# Patient Record
Sex: MALE | Race: WHITE | NOT HISPANIC OR LATINO | Employment: FULL TIME | URBAN - METROPOLITAN AREA
[De-identification: names, ages, dates, MRNs, and addresses within clinical notes are randomized per-mention and may not be internally consistent; named-entity substitution may affect disease eponyms.]

---

## 2021-05-12 ENCOUNTER — RECORDS - HEALTHEAST (OUTPATIENT)
Dept: LAB | Facility: CLINIC | Age: 31
End: 2021-05-12

## 2021-05-12 LAB
ALBUMIN SERPL-MCNC: 4.5 G/DL (ref 3.5–5)
ALP SERPL-CCNC: 79 U/L (ref 45–120)
ALT SERPL W P-5'-P-CCNC: 107 U/L (ref 0–45)
ANION GAP SERPL CALCULATED.3IONS-SCNC: 17 MMOL/L (ref 5–18)
AST SERPL W P-5'-P-CCNC: 104 U/L (ref 0–40)
BILIRUB SERPL-MCNC: 0.9 MG/DL (ref 0–1)
BUN SERPL-MCNC: 8 MG/DL (ref 8–22)
CALCIUM SERPL-MCNC: 8.9 MG/DL (ref 8.5–10.5)
CHLORIDE BLD-SCNC: 105 MMOL/L (ref 98–107)
CO2 SERPL-SCNC: 22 MMOL/L (ref 22–31)
CREAT SERPL-MCNC: 0.73 MG/DL (ref 0.7–1.3)
FERRITIN SERPL-MCNC: 549 NG/ML (ref 27–300)
GFR SERPL CREATININE-BSD FRML MDRD: >60 ML/MIN/1.73M2
GLUCOSE BLD-MCNC: 76 MG/DL (ref 70–125)
IRON SATN MFR SERPL: 38 % (ref 20–50)
IRON SERPL-MCNC: 146 UG/DL (ref 42–175)
POTASSIUM BLD-SCNC: 4.3 MMOL/L (ref 3.5–5)
PROT SERPL-MCNC: 7.4 G/DL (ref 6–8)
SODIUM SERPL-SCNC: 144 MMOL/L (ref 136–145)
TIBC SERPL-MCNC: 381 UG/DL (ref 313–563)
TRANSFERRIN SERPL-MCNC: 305 MG/DL (ref 212–360)
VIT B12 SERPL-MCNC: 165 PG/ML (ref 213–816)

## 2021-06-01 ENCOUNTER — RECORDS - HEALTHEAST (OUTPATIENT)
Dept: ADMINISTRATIVE | Facility: CLINIC | Age: 31
End: 2021-06-01

## 2022-09-07 ENCOUNTER — HOSPITAL ENCOUNTER (EMERGENCY)
Facility: CLINIC | Age: 32
Discharge: HOME | End: 2022-09-08
Attending: STUDENT IN AN ORGANIZED HEALTH CARE EDUCATION/TRAINING PROGRAM | Admitting: STUDENT IN AN ORGANIZED HEALTH CARE EDUCATION/TRAINING PROGRAM
Payer: COMMERCIAL

## 2022-09-07 DIAGNOSIS — E83.42 HYPOMAGNESEMIA: ICD-10-CM

## 2022-09-07 DIAGNOSIS — R11.2 NON-INTRACTABLE VOMITING WITH NAUSEA, UNSPECIFIED VOMITING TYPE: ICD-10-CM

## 2022-09-07 DIAGNOSIS — K70.10 ALCOHOLIC HEPATITIS WITHOUT ASCITES (H): ICD-10-CM

## 2022-09-07 LAB
ALBUMIN SERPL-MCNC: 4.3 G/DL (ref 3.5–5)
ALP SERPL-CCNC: 86 U/L (ref 45–120)
ALT SERPL W P-5'-P-CCNC: 156 U/L (ref 0–45)
ANION GAP SERPL CALCULATED.3IONS-SCNC: 21 MMOL/L (ref 5–18)
AST SERPL W P-5'-P-CCNC: 196 U/L (ref 0–40)
BASOPHILS # BLD AUTO: 0.1 10E3/UL (ref 0–0.2)
BASOPHILS NFR BLD AUTO: 1 %
BILIRUB DIRECT SERPL-MCNC: 0.8 MG/DL
BILIRUB SERPL-MCNC: 2.3 MG/DL (ref 0–1)
BUN SERPL-MCNC: 8 MG/DL (ref 8–22)
CALCIUM SERPL-MCNC: 10.1 MG/DL (ref 8.5–10.5)
CHLORIDE BLD-SCNC: 92 MMOL/L (ref 98–107)
CO2 SERPL-SCNC: 25 MMOL/L (ref 22–31)
CREAT SERPL-MCNC: 0.73 MG/DL (ref 0.7–1.3)
EOSINOPHIL # BLD AUTO: 0 10E3/UL (ref 0–0.7)
EOSINOPHIL NFR BLD AUTO: 0 %
ERYTHROCYTE [DISTWIDTH] IN BLOOD BY AUTOMATED COUNT: 12.4 % (ref 10–15)
ETHANOL SERPL-MCNC: <10 MG/DL
GFR SERPL CREATININE-BSD FRML MDRD: >90 ML/MIN/1.73M2
GLUCOSE BLD-MCNC: 98 MG/DL (ref 70–125)
GLUCOSE BLDC GLUCOMTR-MCNC: 127 MG/DL (ref 70–99)
HCT VFR BLD AUTO: 44.4 % (ref 40–53)
HGB BLD-MCNC: 16.1 G/DL (ref 13.3–17.7)
IMM GRANULOCYTES # BLD: 0 10E3/UL
IMM GRANULOCYTES NFR BLD: 0 %
LIPASE SERPL-CCNC: 27 U/L (ref 0–52)
LYMPHOCYTES # BLD AUTO: 0.7 10E3/UL (ref 0.8–5.3)
LYMPHOCYTES NFR BLD AUTO: 10 %
MAGNESIUM SERPL-MCNC: 1.7 MG/DL (ref 1.8–2.6)
MCH RBC QN AUTO: 33.2 PG (ref 26.5–33)
MCHC RBC AUTO-ENTMCNC: 36.3 G/DL (ref 31.5–36.5)
MCV RBC AUTO: 92 FL (ref 78–100)
MONOCYTES # BLD AUTO: 0.9 10E3/UL (ref 0–1.3)
MONOCYTES NFR BLD AUTO: 13 %
NEUTROPHILS # BLD AUTO: 5.1 10E3/UL (ref 1.6–8.3)
NEUTROPHILS NFR BLD AUTO: 76 %
NRBC # BLD AUTO: 0 10E3/UL
NRBC BLD AUTO-RTO: 0 /100
PLATELET # BLD AUTO: 199 10E3/UL (ref 150–450)
POTASSIUM BLD-SCNC: 2.6 MMOL/L (ref 3.5–5)
PROT SERPL-MCNC: 7.8 G/DL (ref 6–8)
RBC # BLD AUTO: 4.85 10E6/UL (ref 4.4–5.9)
SODIUM SERPL-SCNC: 138 MMOL/L (ref 136–145)
WBC # BLD AUTO: 6.8 10E3/UL (ref 4–11)

## 2022-09-07 PROCEDURE — 82077 ASSAY SPEC XCP UR&BREATH IA: CPT | Performed by: STUDENT IN AN ORGANIZED HEALTH CARE EDUCATION/TRAINING PROGRAM

## 2022-09-07 PROCEDURE — 82310 ASSAY OF CALCIUM: CPT | Performed by: STUDENT IN AN ORGANIZED HEALTH CARE EDUCATION/TRAINING PROGRAM

## 2022-09-07 PROCEDURE — C9113 INJ PANTOPRAZOLE SODIUM, VIA: HCPCS | Performed by: STUDENT IN AN ORGANIZED HEALTH CARE EDUCATION/TRAINING PROGRAM

## 2022-09-07 PROCEDURE — 258N000003 HC RX IP 258 OP 636: Performed by: STUDENT IN AN ORGANIZED HEALTH CARE EDUCATION/TRAINING PROGRAM

## 2022-09-07 PROCEDURE — 83690 ASSAY OF LIPASE: CPT | Performed by: STUDENT IN AN ORGANIZED HEALTH CARE EDUCATION/TRAINING PROGRAM

## 2022-09-07 PROCEDURE — 96366 THER/PROPH/DIAG IV INF ADDON: CPT

## 2022-09-07 PROCEDURE — 82248 BILIRUBIN DIRECT: CPT | Performed by: STUDENT IN AN ORGANIZED HEALTH CARE EDUCATION/TRAINING PROGRAM

## 2022-09-07 PROCEDURE — 250N000011 HC RX IP 250 OP 636: Performed by: STUDENT IN AN ORGANIZED HEALTH CARE EDUCATION/TRAINING PROGRAM

## 2022-09-07 PROCEDURE — 83735 ASSAY OF MAGNESIUM: CPT | Performed by: STUDENT IN AN ORGANIZED HEALTH CARE EDUCATION/TRAINING PROGRAM

## 2022-09-07 PROCEDURE — 99285 EMERGENCY DEPT VISIT HI MDM: CPT | Mod: 25

## 2022-09-07 PROCEDURE — 96361 HYDRATE IV INFUSION ADD-ON: CPT

## 2022-09-07 PROCEDURE — 96375 TX/PRO/DX INJ NEW DRUG ADDON: CPT

## 2022-09-07 PROCEDURE — 85025 COMPLETE CBC W/AUTO DIFF WBC: CPT | Performed by: STUDENT IN AN ORGANIZED HEALTH CARE EDUCATION/TRAINING PROGRAM

## 2022-09-07 PROCEDURE — 96365 THER/PROPH/DIAG IV INF INIT: CPT

## 2022-09-07 PROCEDURE — 36415 COLL VENOUS BLD VENIPUNCTURE: CPT | Performed by: STUDENT IN AN ORGANIZED HEALTH CARE EDUCATION/TRAINING PROGRAM

## 2022-09-07 RX ORDER — POTASSIUM CHLORIDE 1.5 G/1.58G
40 POWDER, FOR SOLUTION ORAL ONCE
Status: COMPLETED | OUTPATIENT
Start: 2022-09-07 | End: 2022-09-08

## 2022-09-07 RX ORDER — SODIUM CHLORIDE, SODIUM LACTATE, POTASSIUM CHLORIDE, CALCIUM CHLORIDE 600; 310; 30; 20 MG/100ML; MG/100ML; MG/100ML; MG/100ML
INJECTION, SOLUTION INTRAVENOUS ONCE
Status: COMPLETED | OUTPATIENT
Start: 2022-09-07 | End: 2022-09-08

## 2022-09-07 RX ORDER — LORAZEPAM 2 MG/ML
1 INJECTION INTRAMUSCULAR ONCE
Status: COMPLETED | OUTPATIENT
Start: 2022-09-07 | End: 2022-09-07

## 2022-09-07 RX ORDER — POTASSIUM CHLORIDE 1.5 G/1.58G
20 POWDER, FOR SOLUTION ORAL 2 TIMES DAILY
Qty: 14 PACKET | Refills: 0 | Status: SHIPPED | OUTPATIENT
Start: 2022-09-07 | End: 2022-09-14

## 2022-09-07 RX ORDER — ONDANSETRON 4 MG/1
4 TABLET, ORALLY DISINTEGRATING ORAL EVERY 6 HOURS PRN
Qty: 12 TABLET | Refills: 0 | Status: SHIPPED | OUTPATIENT
Start: 2022-09-07 | End: 2023-05-19

## 2022-09-07 RX ORDER — ONDANSETRON 4 MG/1
4 TABLET, FILM COATED ORAL ONCE
Status: COMPLETED | OUTPATIENT
Start: 2022-09-07 | End: 2022-09-07

## 2022-09-07 RX ORDER — POTASSIUM CHLORIDE 7.45 MG/ML
10 INJECTION INTRAVENOUS ONCE
Status: COMPLETED | OUTPATIENT
Start: 2022-09-07 | End: 2022-09-08

## 2022-09-07 RX ORDER — POTASSIUM CHLORIDE 7.45 MG/ML
10 INJECTION INTRAVENOUS ONCE
Status: COMPLETED | OUTPATIENT
Start: 2022-09-07 | End: 2022-09-07

## 2022-09-07 RX ADMIN — POTASSIUM CHLORIDE 10 MEQ: 7.46 INJECTION, SOLUTION INTRAVENOUS at 23:09

## 2022-09-07 RX ADMIN — ONDANSETRON HYDROCHLORIDE 4 MG: 4 TABLET, FILM COATED ORAL at 20:34

## 2022-09-07 RX ADMIN — LORAZEPAM 1 MG: 2 INJECTION INTRAMUSCULAR; INTRAVENOUS at 22:00

## 2022-09-07 RX ADMIN — POTASSIUM CHLORIDE 10 MEQ: 7.46 INJECTION, SOLUTION INTRAVENOUS at 22:04

## 2022-09-07 RX ADMIN — PANTOPRAZOLE SODIUM 40 MG: 40 INJECTION, POWDER, FOR SOLUTION INTRAVENOUS at 21:58

## 2022-09-07 RX ADMIN — SODIUM CHLORIDE, POTASSIUM CHLORIDE, SODIUM LACTATE AND CALCIUM CHLORIDE 1000 ML: 600; 310; 30; 20 INJECTION, SOLUTION INTRAVENOUS at 20:46

## 2022-09-07 RX ADMIN — SODIUM CHLORIDE, POTASSIUM CHLORIDE, SODIUM LACTATE AND CALCIUM CHLORIDE: 600; 310; 30; 20 INJECTION, SOLUTION INTRAVENOUS at 22:04

## 2022-09-07 ASSESSMENT — ACTIVITIES OF DAILY LIVING (ADL): ADLS_ACUITY_SCORE: 35

## 2022-09-08 VITALS
TEMPERATURE: 98.4 F | DIASTOLIC BLOOD PRESSURE: 99 MMHG | SYSTOLIC BLOOD PRESSURE: 170 MMHG | HEIGHT: 68 IN | WEIGHT: 135 LBS | HEART RATE: 72 BPM | OXYGEN SATURATION: 99 % | BODY MASS INDEX: 20.46 KG/M2 | RESPIRATION RATE: 14 BRPM

## 2022-09-08 PROCEDURE — 250N000013 HC RX MED GY IP 250 OP 250 PS 637: Performed by: STUDENT IN AN ORGANIZED HEALTH CARE EDUCATION/TRAINING PROGRAM

## 2022-09-08 RX ORDER — MAGNESIUM CHLORIDE 71.5 G/G
2 TABLET ORAL DAILY
Qty: 60 TABLET | Refills: 0 | Status: SHIPPED | OUTPATIENT
Start: 2022-09-08 | End: 2023-03-21

## 2022-09-08 RX ADMIN — POTASSIUM CHLORIDE 40 MEQ: 1.5 POWDER, FOR SOLUTION ORAL at 00:11

## 2022-09-08 NOTE — DISCHARGE INSTRUCTIONS
Your labs showed low potassium which is likely due to your repetitive vomiting.  You are being prescribed Zofran to help with the vomiting, as well as potassium replacement powder to be taken twice daily for the next week.    Your magnesium was also found to be slightly low, please take the attached magnesium replacement/supplementation.    Your labs also showed some elevations of your liver function tests that are likely related to your recent binge drinking.    We recommend you follow-up with your primary care provider in the next 2 weeks to have your potassium and liver function tests rechecked to make sure that they are normalizing.

## 2022-09-08 NOTE — ED NOTES
AIDET performed, white board updated for rounding. Patient updated on plan of care. Patient's pain assessed. Call light within reach, bed in low position, side rails up. Visitor at bedside: wife

## 2022-09-08 NOTE — ED PROVIDER NOTES
EMERGENCY DEPARTMENT ENCOUNTER       ED Course & Medical Decision Making     8:39 PM I met with patient for initial interview and encounter. PPE worn includes N95 mask.  9:07 PM Received call from lab. Patient's potassium is 2.6.    Final Impression  32 year old male presents for evaluation of nausea, vomiting ongoing since yesterday morning.  Patient has a remote history of alcohol abuse, though had been sober for several months.  Reports a recent episode of binge drinking from Friday through Sunday, then began developing nausea and vomiting on Monday.  Initially concerned about alcohol withdrawal at triage, though heart rate normal, no tremors or other clinical signs of alcohol withdrawal other than the nausea vomiting.  Stronger suspicion for alcoholic gastritis or alcoholic hepatitis.  Patient given empiric fluids, Zofran and symptoms significantly improved.  Was subsequently given IV Protonix and a single dose of Ativan.  Labs returned showing elevated AST over ALT, mild bili elevation at 2.3.  Patient has no right upper quadrant pain on exam low clinical suspicion for biliary pathology based on his lack of pain.  Lipase normal.  Potassium moderately low at 2.6, magnesium slightly low at 1.7.  Given 40 mEq oral potassium and 20 mEq IV potassium replacement on the ED in addition to bolus of LR.  Patient tolerating oral intake.  Discussed magnesium and potassium replacement with patient, as well as getting these rechecked with his LFTs in a few weeks to make sure that things are improving.  No clinical withdrawal symptoms.  Will discharge home once his fluids are complete.    Prior to making a final disposition on this patient the results of patient's tests and other diagnostic studies were discussed with the patient. All questions were answered. Patient expressed understanding of the plan and was amenable to it.    Medications   potassium chloride (KLOR-CON) Packet 40 mEq (has no administration in time range)    lactated ringers BOLUS 1,000 mL (0 mLs Intravenous Stopped 9/7/22 2308)   ondansetron (ZOFRAN) tablet 4 mg (4 mg Oral Given 9/7/22 2034)   pantoprazole (PROTONIX) IV push injection 40 mg (40 mg Intravenous Given 9/7/22 2158)   LORazepam (ATIVAN) injection 1 mg (1 mg Intravenous Given 9/7/22 2200)   potassium chloride 10 mEq in 100 mL sterile water intermittent infusion (premix) (0 mEq Intravenous Stopped 9/8/22 0009)   potassium chloride 10 mEq in 100 mL sterile water intermittent infusion (premix) (0 mEq Intravenous Stopped 9/7/22 2308)   lactated ringers infusion ( Intravenous Rate/Dose Verify 9/7/22 2311)       Final Impression     1. Alcoholic hepatitis without ascites    2. Non-intractable vomiting with nausea, unspecified vomiting type      Chief Complaint     Chief Complaint   Patient presents with     Vomiting     Withdrawal     Pt presents to ED with c/o vomiting since Tuesday around 10 am.  Pt states he threw up at least 100 times.  Pt has been sober for months, but did drink this past weekend.  States last drink was on Sunday.  States has not eaten since Monday night.      BRIANA Mcclelland is a 32 year old male who presents for evaluation of vomiting.     Patient presents to the ED with constant vomiting since yesterday (09/06) morning at approximately 10 AM. Patient reports that everything he eats or drinks is puked up within 10 minutes. Patient had similar symptoms back in March. He reports of consumption of alcohol Friday (09/02) through Sunday (09/04). Patient does report a history of withdrawal, but states that it has never been this severe. Denies any history of pancreatitis. Otherwise reports restlessness due to the frequency of vomiting. Patient denies stomach pain or blood in the vomit. Patient denies any other complaints and/or concerns.       IEfrem am serving as a scribe to document services personally performed by Dr. Leonardo Weinstein MD, based on my observation and the  "provider's statements to me. I, Dr. Leonardo Weinstein MD attest that Efrem Chappell is acting in a scribe capacity, has observed my performance of the services and has documented them in accordance with my direction.    Past Medical History     History reviewed. No pertinent past medical history.  Past Surgical History:   Procedure Laterality Date     IR MISCELLANEOUS PROCEDURE  7/27/2009     IR MISCELLANEOUS PROCEDURE  2/26/2012     No family history on file.      Allergies   Allergen Reactions     Cephalosporins Rash       Relevant past medical, surgical, family and social history as documented above, has been reviewed and discussed with patient. No changes or additions, unless otherwise noted in the HPI.    Current Medications     ondansetron (ZOFRAN ODT) 4 MG ODT tab  potassium chloride (KLOR-CON) 20 MEQ packet      Review of Systems     Constitutional: Denies fever, chills, unintentional weight loss or fatigue.      Eyes: Denies visual changes or discharge.          HENT: Denies sore throat, ear pain or neck pain.      Respiratory: Denies cough or shortness of breath.      Cardiovascular: Denies chest pain, palpitations or leg swelling.      GI: Positive for vomiting. Denies abdominal pain, nausea, or dark, bloody stools.      : Denies hematuria, dysuria, or flank pain.      Musculoskeletal: Denies any new back pain or new muscle/joint pains.      Skin: Denies rashes or wound.      Neurologic: Denies current headache, new weakness, focal weakness, or sensory changes.        Remainder of systems reviewed, unless noted in HPI all others negative.    Physical Exam     BP (!) 136/91   Pulse 73   Temp 98.4  F (36.9  C) (Oral)   Resp 16   Ht 1.727 m (5' 8\")   Wt 61.2 kg (135 lb)   SpO2 98%   BMI 20.53 kg/m    Constitutional: Awake, alert, in no acute distress.      Head: Normocephalic, atraumatic.      ENT: Mucous membranes moist.      Eyes: PERRL, EOMI, Conjunctiva normal.      Respiratory: Respirations " even, unlabored. Lungs clear to ascultation bilaterally, in no acute respiratory distress.      Cardiovascular: Regular rate and rhythm. +2 radial pulses, equal bilaterally. No pitting edema.      GI: Abdomen soft, non-tender to palpation in all 4 quadrants. No guarding or rebound.  Negative Mcmanus sign.  Bowel sounds intact on all 4 quadrants.      Musculoskeletal: Moves all 4 extremities equally, strength symmetrical on bilateral uppers and lowers.      Integument: Warm, dry. No bruising or petechiae.      Neurologic: Alert & oriented x 3. Normal speech. Grossly normal motor and sensory function. No focal deficits noted.      Psychiatric: Normal mood and affect. Normal judgement.        Labs & Imaging     Results for orders placed or performed during the hospital encounter of 09/07/22   Glucose by meter   Result Value Ref Range    GLUCOSE BY METER POCT 127 (H) 70 - 99 mg/dL   Result Value Ref Range    Lipase 27 0 - 52 U/L   Basic metabolic panel   Result Value Ref Range    Sodium 138 136 - 145 mmol/L    Potassium 2.6 (LL) 3.5 - 5.0 mmol/L    Chloride 92 (L) 98 - 107 mmol/L    Carbon Dioxide (CO2) 25 22 - 31 mmol/L    Anion Gap 21 (H) 5 - 18 mmol/L    Urea Nitrogen 8 8 - 22 mg/dL    Creatinine 0.73 0.70 - 1.30 mg/dL    Calcium 10.1 8.5 - 10.5 mg/dL    Glucose 98 70 - 125 mg/dL    GFR Estimate >90 >60 mL/min/1.73m2   Hepatic function panel   Result Value Ref Range    Bilirubin Total 2.3 (H) 0.0 - 1.0 mg/dL    Bilirubin Direct 0.8 (H) <=0.5 mg/dL    Protein Total 7.8 6.0 - 8.0 g/dL    Albumin 4.3 3.5 - 5.0 g/dL    Alkaline Phosphatase 86 45 - 120 U/L     (H) 0 - 40 U/L     (H) 0 - 45 U/L   Alcohol level blood   Result Value Ref Range    Alcohol, Blood <10 None detected mg/dL   CBC with platelets and differential   Result Value Ref Range    WBC Count 6.8 4.0 - 11.0 10e3/uL    RBC Count 4.85 4.40 - 5.90 10e6/uL    Hemoglobin 16.1 13.3 - 17.7 g/dL    Hematocrit 44.4 40.0 - 53.0 %    MCV 92 78 - 100 fL     MCH 33.2 (H) 26.5 - 33.0 pg    MCHC 36.3 31.5 - 36.5 g/dL    RDW 12.4 10.0 - 15.0 %    Platelet Count 199 150 - 450 10e3/uL    % Neutrophils 76 %    % Lymphocytes 10 %    % Monocytes 13 %    % Eosinophils 0 %    % Basophils 1 %    % Immature Granulocytes 0 %    NRBCs per 100 WBC 0 <1 /100    Absolute Neutrophils 5.1 1.6 - 8.3 10e3/uL    Absolute Lymphocytes 0.7 (L) 0.8 - 5.3 10e3/uL    Absolute Monocytes 0.9 0.0 - 1.3 10e3/uL    Absolute Eosinophils 0.0 0.0 - 0.7 10e3/uL    Absolute Basophils 0.1 0.0 - 0.2 10e3/uL    Absolute Immature Granulocytes 0.0 <=0.4 10e3/uL    Absolute NRBCs 0.0 10e3/uL   Result Value Ref Range    Magnesium 1.7 (L) 1.8 - 2.6 mg/dL          Leonardo Weinstein MD  09/08/22 0016

## 2022-09-08 NOTE — ED TRIAGE NOTES
Pt presents to ED with c/o vomiting since Tuesday around 10 am.  Pt states he threw up at least 100 times.  Pt has been sober for months, but did drink this past weekend.  States last drink was on Sunday.  States has not eaten since Monday night.       Triage Assessment     Row Name 09/07/22 1951       Triage Assessment (Adult)    Airway WDL WDL       Respiratory WDL    Respiratory WDL WDL       Skin Circulation/Temperature WDL    Skin Circulation/Temperature WDL WDL       Cardiac WDL    Cardiac WDL WDL       Peripheral/Neurovascular WDL    Peripheral Neurovascular WDL WDL       Cognitive/Neuro/Behavioral WDL    Cognitive/Neuro/Behavioral WDL WDL

## 2023-03-20 ENCOUNTER — HOSPITAL ENCOUNTER (INPATIENT)
Facility: CLINIC | Age: 33
LOS: 2 days | Discharge: HOME | DRG: 897 | End: 2023-03-22
Attending: EMERGENCY MEDICINE | Admitting: INTERNAL MEDICINE
Payer: COMMERCIAL

## 2023-03-20 ENCOUNTER — APPOINTMENT (OUTPATIENT)
Dept: CT IMAGING | Facility: CLINIC | Age: 33
DRG: 897 | End: 2023-03-20
Attending: EMERGENCY MEDICINE
Payer: COMMERCIAL

## 2023-03-20 DIAGNOSIS — R56.9 ALCOHOL WITHDRAWAL SEIZURE WITHOUT COMPLICATION (H): ICD-10-CM

## 2023-03-20 DIAGNOSIS — F10.930 ALCOHOL WITHDRAWAL SEIZURE WITHOUT COMPLICATION (H): ICD-10-CM

## 2023-03-20 DIAGNOSIS — K70.10 ALCOHOLIC HEPATITIS WITHOUT ASCITES (H): Primary | ICD-10-CM

## 2023-03-20 LAB
ALBUMIN SERPL-MCNC: 4.1 G/DL (ref 3.5–5)
ALP SERPL-CCNC: 105 U/L (ref 45–120)
ALT SERPL W P-5'-P-CCNC: 313 U/L (ref 0–45)
ANION GAP SERPL CALCULATED.3IONS-SCNC: 15 MMOL/L (ref 5–18)
AST SERPL W P-5'-P-CCNC: 450 U/L (ref 0–40)
ATRIAL RATE - MUSE: 80 BPM
BASOPHILS # BLD AUTO: 0.1 10E3/UL (ref 0–0.2)
BASOPHILS NFR BLD AUTO: 1 %
BILIRUB SERPL-MCNC: 1.7 MG/DL (ref 0–1)
BUN SERPL-MCNC: 8 MG/DL (ref 8–22)
CALCIUM SERPL-MCNC: 8.9 MG/DL (ref 8.5–10.5)
CHLORIDE BLD-SCNC: 97 MMOL/L (ref 98–107)
CO2 SERPL-SCNC: 27 MMOL/L (ref 22–31)
CREAT SERPL-MCNC: 0.7 MG/DL (ref 0.7–1.3)
DIASTOLIC BLOOD PRESSURE - MUSE: 91 MMHG
EOSINOPHIL # BLD AUTO: 0.2 10E3/UL (ref 0–0.7)
EOSINOPHIL NFR BLD AUTO: 3 %
ERYTHROCYTE [DISTWIDTH] IN BLOOD BY AUTOMATED COUNT: 12.4 % (ref 10–15)
ETHANOL SERPL-MCNC: <10 MG/DL
GFR SERPL CREATININE-BSD FRML MDRD: >90 ML/MIN/1.73M2
GLUCOSE BLD-MCNC: 91 MG/DL (ref 70–125)
HCT VFR BLD AUTO: 40.2 % (ref 40–53)
HGB BLD-MCNC: 14.3 G/DL (ref 13.3–17.7)
IMM GRANULOCYTES # BLD: 0 10E3/UL
IMM GRANULOCYTES NFR BLD: 0 %
INTERPRETATION ECG - MUSE: NORMAL
LIPASE SERPL-CCNC: 46 U/L (ref 0–52)
LYMPHOCYTES # BLD AUTO: 0.7 10E3/UL (ref 0.8–5.3)
LYMPHOCYTES NFR BLD AUTO: 13 %
MCH RBC QN AUTO: 33.6 PG (ref 26.5–33)
MCHC RBC AUTO-ENTMCNC: 35.6 G/DL (ref 31.5–36.5)
MCV RBC AUTO: 94 FL (ref 78–100)
MONOCYTES # BLD AUTO: 0.5 10E3/UL (ref 0–1.3)
MONOCYTES NFR BLD AUTO: 10 %
NEUTROPHILS # BLD AUTO: 4 10E3/UL (ref 1.6–8.3)
NEUTROPHILS NFR BLD AUTO: 73 %
NRBC # BLD AUTO: 0 10E3/UL
NRBC BLD AUTO-RTO: 0 /100
P AXIS - MUSE: 50 DEGREES
PLATELET # BLD AUTO: 155 10E3/UL (ref 150–450)
POTASSIUM BLD-SCNC: 3 MMOL/L (ref 3.5–5)
PR INTERVAL - MUSE: 100 MS
PROT SERPL-MCNC: 7.1 G/DL (ref 6–8)
QRS DURATION - MUSE: 92 MS
QT - MUSE: 406 MS
QTC - MUSE: 468 MS
R AXIS - MUSE: 54 DEGREES
RBC # BLD AUTO: 4.26 10E6/UL (ref 4.4–5.9)
SODIUM SERPL-SCNC: 139 MMOL/L (ref 136–145)
SYSTOLIC BLOOD PRESSURE - MUSE: 140 MMHG
T AXIS - MUSE: 58 DEGREES
VENTRICULAR RATE- MUSE: 80 BPM
WBC # BLD AUTO: 5.5 10E3/UL (ref 4–11)

## 2023-03-20 PROCEDURE — HZ2ZZZZ DETOXIFICATION SERVICES FOR SUBSTANCE ABUSE TREATMENT: ICD-10-PCS | Performed by: INTERNAL MEDICINE

## 2023-03-20 PROCEDURE — 96365 THER/PROPH/DIAG IV INF INIT: CPT

## 2023-03-20 PROCEDURE — 250N000011 HC RX IP 250 OP 636: Performed by: EMERGENCY MEDICINE

## 2023-03-20 PROCEDURE — 96375 TX/PRO/DX INJ NEW DRUG ADDON: CPT

## 2023-03-20 PROCEDURE — 99285 EMERGENCY DEPT VISIT HI MDM: CPT | Mod: 25

## 2023-03-20 PROCEDURE — 80053 COMPREHEN METABOLIC PANEL: CPT | Performed by: EMERGENCY MEDICINE

## 2023-03-20 PROCEDURE — 250N000013 HC RX MED GY IP 250 OP 250 PS 637: Performed by: EMERGENCY MEDICINE

## 2023-03-20 PROCEDURE — 120N000001 HC R&B MED SURG/OB

## 2023-03-20 PROCEDURE — 99223 1ST HOSP IP/OBS HIGH 75: CPT | Performed by: INTERNAL MEDICINE

## 2023-03-20 PROCEDURE — 36415 COLL VENOUS BLD VENIPUNCTURE: CPT | Performed by: EMERGENCY MEDICINE

## 2023-03-20 PROCEDURE — 70450 CT HEAD/BRAIN W/O DYE: CPT

## 2023-03-20 PROCEDURE — 258N000003 HC RX IP 258 OP 636: Performed by: EMERGENCY MEDICINE

## 2023-03-20 PROCEDURE — 85025 COMPLETE CBC W/AUTO DIFF WBC: CPT | Performed by: EMERGENCY MEDICINE

## 2023-03-20 PROCEDURE — 93005 ELECTROCARDIOGRAM TRACING: CPT | Performed by: EMERGENCY MEDICINE

## 2023-03-20 PROCEDURE — 83690 ASSAY OF LIPASE: CPT | Performed by: EMERGENCY MEDICINE

## 2023-03-20 PROCEDURE — 82077 ASSAY SPEC XCP UR&BREATH IA: CPT | Performed by: EMERGENCY MEDICINE

## 2023-03-20 RX ORDER — FOLIC ACID 1 MG/1
1 TABLET ORAL ONCE
Status: COMPLETED | OUTPATIENT
Start: 2023-03-20 | End: 2023-03-20

## 2023-03-20 RX ORDER — LORAZEPAM 2 MG/ML
1 INJECTION INTRAMUSCULAR ONCE
Status: COMPLETED | OUTPATIENT
Start: 2023-03-20 | End: 2023-03-20

## 2023-03-20 RX ADMIN — THIAMINE HYDROCHLORIDE 500 MG: 100 INJECTION, SOLUTION INTRAMUSCULAR; INTRAVENOUS at 23:01

## 2023-03-20 RX ADMIN — FOLIC ACID 1 MG: 1 TABLET ORAL at 22:39

## 2023-03-20 RX ADMIN — SODIUM CHLORIDE 1000 ML: 9 INJECTION, SOLUTION INTRAVENOUS at 22:35

## 2023-03-20 RX ADMIN — LORAZEPAM 1 MG: 2 INJECTION INTRAMUSCULAR; INTRAVENOUS at 22:39

## 2023-03-20 ASSESSMENT — ACTIVITIES OF DAILY LIVING (ADL): ADLS_ACUITY_SCORE: 35

## 2023-03-21 ENCOUNTER — APPOINTMENT (OUTPATIENT)
Dept: ULTRASOUND IMAGING | Facility: CLINIC | Age: 33
DRG: 897 | End: 2023-03-21
Attending: HOSPITALIST
Payer: COMMERCIAL

## 2023-03-21 PROBLEM — K70.10 ALCOHOLIC HEPATITIS WITHOUT ASCITES (H): Status: ACTIVE | Noted: 2023-03-21

## 2023-03-21 LAB
ALBUMIN SERPL-MCNC: 3.7 G/DL (ref 3.5–5)
ALP SERPL-CCNC: 88 U/L (ref 45–120)
ALT SERPL W P-5'-P-CCNC: 278 U/L (ref 0–45)
ANION GAP SERPL CALCULATED.3IONS-SCNC: 12 MMOL/L (ref 5–18)
AST SERPL W P-5'-P-CCNC: 373 U/L (ref 0–40)
BASOPHILS # BLD AUTO: 0.1 10E3/UL (ref 0–0.2)
BASOPHILS NFR BLD AUTO: 1 %
BILIRUB SERPL-MCNC: 2.1 MG/DL (ref 0–1)
BUN SERPL-MCNC: 5 MG/DL (ref 8–22)
CALCIUM SERPL-MCNC: 8.6 MG/DL (ref 8.5–10.5)
CHLORIDE BLD-SCNC: 101 MMOL/L (ref 98–107)
CO2 SERPL-SCNC: 25 MMOL/L (ref 22–31)
CREAT SERPL-MCNC: 0.65 MG/DL (ref 0.7–1.3)
EOSINOPHIL # BLD AUTO: 0.2 10E3/UL (ref 0–0.7)
EOSINOPHIL NFR BLD AUTO: 3 %
ERYTHROCYTE [DISTWIDTH] IN BLOOD BY AUTOMATED COUNT: 12.4 % (ref 10–15)
GFR SERPL CREATININE-BSD FRML MDRD: >90 ML/MIN/1.73M2
GLUCOSE BLD-MCNC: 81 MG/DL (ref 70–125)
HCT VFR BLD AUTO: 37.2 % (ref 40–53)
HGB BLD-MCNC: 13.2 G/DL (ref 13.3–17.7)
IMM GRANULOCYTES # BLD: 0 10E3/UL
IMM GRANULOCYTES NFR BLD: 1 %
LYMPHOCYTES # BLD AUTO: 0.9 10E3/UL (ref 0.8–5.3)
LYMPHOCYTES NFR BLD AUTO: 14 %
MAGNESIUM SERPL-MCNC: 1.2 MG/DL (ref 1.8–2.6)
MAGNESIUM SERPL-MCNC: 2.3 MG/DL (ref 1.8–2.6)
MCH RBC QN AUTO: 34.4 PG (ref 26.5–33)
MCHC RBC AUTO-ENTMCNC: 35.5 G/DL (ref 31.5–36.5)
MCV RBC AUTO: 97 FL (ref 78–100)
MONOCYTES # BLD AUTO: 0.7 10E3/UL (ref 0–1.3)
MONOCYTES NFR BLD AUTO: 11 %
NEUTROPHILS # BLD AUTO: 4.5 10E3/UL (ref 1.6–8.3)
NEUTROPHILS NFR BLD AUTO: 70 %
NRBC # BLD AUTO: 0 10E3/UL
NRBC BLD AUTO-RTO: 0 /100
PHOSPHATE SERPL-MCNC: 3.5 MG/DL (ref 2.5–4.5)
PLATELET # BLD AUTO: 148 10E3/UL (ref 150–450)
POTASSIUM BLD-SCNC: 3.6 MMOL/L (ref 3.5–5)
POTASSIUM BLD-SCNC: 4.1 MMOL/L (ref 3.5–5)
PROT SERPL-MCNC: 6.3 G/DL (ref 6–8)
RBC # BLD AUTO: 3.84 10E6/UL (ref 4.4–5.9)
SARS-COV-2 RNA RESP QL NAA+PROBE: NEGATIVE
SODIUM SERPL-SCNC: 138 MMOL/L (ref 136–145)
WBC # BLD AUTO: 6.5 10E3/UL (ref 4–11)

## 2023-03-21 PROCEDURE — 85025 COMPLETE CBC W/AUTO DIFF WBC: CPT | Performed by: INTERNAL MEDICINE

## 2023-03-21 PROCEDURE — 84132 ASSAY OF SERUM POTASSIUM: CPT | Performed by: INTERNAL MEDICINE

## 2023-03-21 PROCEDURE — 83735 ASSAY OF MAGNESIUM: CPT | Performed by: INTERNAL MEDICINE

## 2023-03-21 PROCEDURE — 36415 COLL VENOUS BLD VENIPUNCTURE: CPT | Performed by: INTERNAL MEDICINE

## 2023-03-21 PROCEDURE — 76705 ECHO EXAM OF ABDOMEN: CPT

## 2023-03-21 PROCEDURE — 250N000011 HC RX IP 250 OP 636: Performed by: INTERNAL MEDICINE

## 2023-03-21 PROCEDURE — 84100 ASSAY OF PHOSPHORUS: CPT | Performed by: INTERNAL MEDICINE

## 2023-03-21 PROCEDURE — 120N000001 HC R&B MED SURG/OB

## 2023-03-21 PROCEDURE — 250N000013 HC RX MED GY IP 250 OP 250 PS 637: Performed by: INTERNAL MEDICINE

## 2023-03-21 PROCEDURE — 80053 COMPREHEN METABOLIC PANEL: CPT | Performed by: INTERNAL MEDICINE

## 2023-03-21 PROCEDURE — U0005 INFEC AGEN DETEC AMPLI PROBE: HCPCS | Performed by: HOSPITALIST

## 2023-03-21 PROCEDURE — 99232 SBSQ HOSP IP/OBS MODERATE 35: CPT | Performed by: HOSPITALIST

## 2023-03-21 RX ORDER — FLUMAZENIL 0.1 MG/ML
0.2 INJECTION, SOLUTION INTRAVENOUS
Status: DISCONTINUED | OUTPATIENT
Start: 2023-03-21 | End: 2023-03-21

## 2023-03-21 RX ORDER — GABAPENTIN 100 MG/1
200 CAPSULE ORAL EVERY 8 HOURS
Status: DISCONTINUED | OUTPATIENT
Start: 2023-03-21 | End: 2023-03-21

## 2023-03-21 RX ORDER — ENOXAPARIN SODIUM 100 MG/ML
40 INJECTION SUBCUTANEOUS DAILY
Status: DISCONTINUED | OUTPATIENT
Start: 2023-03-22 | End: 2023-03-22 | Stop reason: HOSPADM

## 2023-03-21 RX ORDER — HALOPERIDOL 5 MG/ML
2.5-5 INJECTION INTRAMUSCULAR EVERY 6 HOURS PRN
Status: DISCONTINUED | OUTPATIENT
Start: 2023-03-21 | End: 2023-03-21

## 2023-03-21 RX ORDER — CLONIDINE HYDROCHLORIDE 0.1 MG/1
0.1 TABLET ORAL EVERY 8 HOURS
Status: DISCONTINUED | OUTPATIENT
Start: 2023-03-21 | End: 2023-03-21

## 2023-03-21 RX ORDER — FOLIC ACID 1 MG/1
1 TABLET ORAL DAILY
Status: DISCONTINUED | OUTPATIENT
Start: 2023-03-21 | End: 2023-03-22 | Stop reason: HOSPADM

## 2023-03-21 RX ORDER — LORAZEPAM 1 MG/1
1-2 TABLET ORAL EVERY 30 MIN PRN
Status: DISCONTINUED | OUTPATIENT
Start: 2023-03-21 | End: 2023-03-22 | Stop reason: HOSPADM

## 2023-03-21 RX ORDER — MULTIPLE VITAMINS W/ MINERALS TAB 9MG-400MCG
1 TAB ORAL DAILY
COMMUNITY
End: 2023-08-22

## 2023-03-21 RX ORDER — POTASSIUM CHLORIDE 1500 MG/1
40 TABLET, EXTENDED RELEASE ORAL ONCE
Status: COMPLETED | OUTPATIENT
Start: 2023-03-21 | End: 2023-03-21

## 2023-03-21 RX ORDER — MULTIPLE VITAMINS W/ MINERALS TAB 9MG-400MCG
1 TAB ORAL DAILY
Status: DISCONTINUED | OUTPATIENT
Start: 2023-03-21 | End: 2023-03-22 | Stop reason: HOSPADM

## 2023-03-21 RX ORDER — GABAPENTIN 100 MG/1
100 CAPSULE ORAL 3 TIMES DAILY
Status: DISCONTINUED | OUTPATIENT
Start: 2023-03-21 | End: 2023-03-21

## 2023-03-21 RX ORDER — SODIUM CHLORIDE AND POTASSIUM CHLORIDE 150; 900 MG/100ML; MG/100ML
INJECTION, SOLUTION INTRAVENOUS CONTINUOUS
Status: DISCONTINUED | OUTPATIENT
Start: 2023-03-21 | End: 2023-03-21

## 2023-03-21 RX ORDER — LIDOCAINE 40 MG/G
CREAM TOPICAL
Status: DISCONTINUED | OUTPATIENT
Start: 2023-03-21 | End: 2023-03-22 | Stop reason: HOSPADM

## 2023-03-21 RX ORDER — POTASSIUM CHLORIDE 1500 MG/1
20 TABLET, EXTENDED RELEASE ORAL ONCE
Status: COMPLETED | OUTPATIENT
Start: 2023-03-21 | End: 2023-03-21

## 2023-03-21 RX ORDER — MAGNESIUM SULFATE 4 G/50ML
4 INJECTION INTRAVENOUS ONCE
Status: COMPLETED | OUTPATIENT
Start: 2023-03-21 | End: 2023-03-21

## 2023-03-21 RX ORDER — GABAPENTIN 100 MG/1
200 CAPSULE ORAL 3 TIMES DAILY
Status: DISCONTINUED | OUTPATIENT
Start: 2023-03-21 | End: 2023-03-21

## 2023-03-21 RX ORDER — LORAZEPAM 2 MG/ML
1-2 INJECTION INTRAMUSCULAR EVERY 30 MIN PRN
Status: DISCONTINUED | OUTPATIENT
Start: 2023-03-21 | End: 2023-03-22 | Stop reason: HOSPADM

## 2023-03-21 RX ADMIN — POTASSIUM CHLORIDE 20 MEQ: 1500 TABLET, EXTENDED RELEASE ORAL at 06:25

## 2023-03-21 RX ADMIN — CLONIDINE HYDROCHLORIDE 0.1 MG: 0.1 TABLET ORAL at 12:01

## 2023-03-21 RX ADMIN — POTASSIUM CHLORIDE AND SODIUM CHLORIDE: 900; 150 INJECTION, SOLUTION INTRAVENOUS at 04:04

## 2023-03-21 RX ADMIN — MAGNESIUM SULFATE HEPTAHYDRATE 4 G: 4 INJECTION, SOLUTION INTRAVENOUS at 04:52

## 2023-03-21 RX ADMIN — CLONIDINE HYDROCHLORIDE 0.1 MG: 0.1 TABLET ORAL at 03:54

## 2023-03-21 RX ADMIN — GABAPENTIN 200 MG: 100 CAPSULE ORAL at 03:54

## 2023-03-21 RX ADMIN — GABAPENTIN 200 MG: 100 CAPSULE ORAL at 12:02

## 2023-03-21 RX ADMIN — MULTIPLE VITAMINS W/ MINERALS TAB 1 TABLET: TAB at 09:32

## 2023-03-21 RX ADMIN — FOLIC ACID 1 MG: 1 TABLET ORAL at 09:32

## 2023-03-21 RX ADMIN — POTASSIUM CHLORIDE 40 MEQ: 1500 TABLET, EXTENDED RELEASE ORAL at 04:16

## 2023-03-21 RX ADMIN — LORAZEPAM 2 MG: 1 TABLET ORAL at 22:55

## 2023-03-21 RX ADMIN — Medication 100 MG: at 09:32

## 2023-03-21 ASSESSMENT — ACTIVITIES OF DAILY LIVING (ADL)
ADLS_ACUITY_SCORE: 37
ADLS_ACUITY_SCORE: 37
ADLS_ACUITY_SCORE: 35
ADLS_ACUITY_SCORE: 37
ADLS_ACUITY_SCORE: 35
ADLS_ACUITY_SCORE: 37

## 2023-03-21 NOTE — PLAN OF CARE
Vss. A&O. Denies pain. Scoring 2 on CIWA r/t tremors. Tele running SR. Remains on Mg (2.3) and K (4.1) protocols which are a recheck in the AM. Plan to discharge home with partner once medically cleared. Will continue to monitor.

## 2023-03-21 NOTE — UTILIZATION REVIEW
Inpatient appropriate  Admission Status; Secondary Review Determination     Under the authority of the Utilization Management Committee, the utilization review process indicated a secondary review on the above patient. The review outcome is based on review of the medical records, discussions with staff, and applying clinical experience noted on the date of the review.     (x) Inpatient Status Appropriate - This patient's medical care is consistent with medical management for inpatient care and reasonable inpatient medical practice.     RATIONALE FOR DETERMINATION   34-year-old male brought to the ED by ambulance for evaluation of seizure.  Past medical history of alcohol use disorder and alcohol withdrawals.  Vital signs remarkable for hypertension 140/91 on arrival.  Laboratory work-up remarkable for potassium 3, chloride 97, , , total bilirubin 1.7.  CT head showed no acute intracranial process.  Patient was admitted for management of alcohol withdrawals with witnessed alcohol withdrawal seizure.  Benzodiazepines per CIWA protocol and scheduled clonidine 0.1 mg every 8 hours and gabapentin 200 mg every 8 hours.  In addition, abnormal LFTs likely hepatitis secondary to alcohol use.  Requiring electrolytes replacement for potassium and magnesium.  At the time of admission with the information available to the attending physician more than 2 nights Hospital complex care was anticipated, based on patient risk of adverse outcome if treated as outpatient and complex care required. Inpatient admission is appropriate based on the Medicare guidelines.     This document was produced using voice recognition software     The information on this document is developed by the utilization review team in order for the business office to ensure compliance. This only denotes the appropriateness of proper admission status and does not reflect the quality of care rendered.   The definitions of Inpatient Status and  Observation Status used in making the determination above are those provided in the CMS Coverage Manual, Chapter 1 and Chapter 6, section 70.4.     Sincerely,     Brenden Gabriel MD  [facility]  Utilization Review Physician Advisor  Pager: [phone]

## 2023-03-21 NOTE — H&P
[facility] MEDICINE ADMISSION HISTORY AND PHYSICAL       Assessment & Plan      1. Seizure, prob alcohol related, as sign of alcohol withdrawal - last drink was 3 days ago. Had been drinking everyday, except for sat and Sunday. It was a witnessed seizure.    Head CT is negative. Non focal on exams. He was initially anxious, shaky but feels better since ativan.     - continue CIWA protocol - continue ativan, may not need the large MELO doses, will start at 200 mgs TID of gabapentin and will have AM doc re-eval to see if need more.    - electolyte checks  - consider neurology ref  - neuro checks  - seizure precs     2. Elevated AST/ALT - could be hepatitis sec to alcohol use. No abdominal pain.  - repeat LFTs in AM  - if worsening consider imaging     3. Hypokalemia, Mag of 1.2  - electrolyte protocol     VTE prophylaxis: SCDs,  Diet:  Regular   Code Status: Full  COVID vaccination: yes  Barriers to discharge: admitting clinical condition  Discharge Disposition and goals:  Unable to determine at this point, pending clinical progress and response to treatment. Patient may need transfer to SNF or ACR if unsafe to go home and needed treatment inappropriate at home setting OR may need home health care evaluation if care can be delivered at home settings. Consider referral to care manager/    PPE - I was wearing PPE when I met the patient including but not limited to - N95 mask, Gloves, and/or Safety glasses.      Care plan was created based on available information and patient's condition at the time of encounter. This was discussed with the patient and/or family members using layman's terms, including counseling/education and they have agreed to proceed. I recommend to revise care plan and to review history if there is change in condition and/or new clinical information that is not available during my encounter. At the end of night shift, this case will be presented to the AM  Hospitalist.       80 minutes spent by me on the date of service doing chart review, history, exam, diagnostic test results interpretation, documentation & further activities per the note.        Doe Gonzalez MD, MPH, FACP, [facility]  Internal Medicine - Hospitalist        Chief Complaint Seizure      HISTORY     - I met him in ED-D. Met his wife too.    - He was playing video games today, and all of a sudden had seizure for a minute, witnessed by wife, looks like GTC seizure. No prior    - Drink vodka everyday and last drink was 3 days ago. Had prior alcohol withdrawal but not seizure  - Today, he feels shaky, tremor and anxious. No CP or SOB    - In the ED, head CT is negative for acute findings. AST of 450. ALT of 300+. K of 3. He was given ativan.     - ROS --- No headache. No dizziness. No weakness. No CP or SOB. No palpitations. No abdominal pain. No nausea or vomiting. No urinary symptoms. No bleeding symptoms. No weight loss. Rest of 12 point ROS was reviewed and negative.       Past Medical History     No past medical history on file.      Surgical History     Past Surgical History:   Procedure Laterality Date     IR MISCELLANEOUS PROCEDURE  7/27/2009     IR MISCELLANEOUS PROCEDURE  2/26/2012        Family History      No family history on file.      Social History      .  Social History     Socioeconomic History     Marital status:      Spouse name: Not on file     Number of children: Not on file     Years of education: Not on file     Highest education level: Not on file   Occupational History     Not on file   Tobacco Use     Smoking status: Not on file     Smokeless tobacco: Not on file   Substance and Sexual Activity     Alcohol use: Not on file     Drug use: Not on file     Sexual activity: Not on file   Other Topics Concern     Not on file   Social History Narrative     Not on file     Social Determinants of Health     Financial Resource Strain: Not on file   Food Insecurity: Not on file    Transportation Needs: Not on file   Physical Activity: Not on file   Stress: Not on file   Social Connections: Not on file   Intimate Partner Violence: Not on file   Housing Stability: Not on file          Allergies        Allergies   Allergen Reactions     Cephalosporins Rash         Prior to Admission Medications      No current facility-administered medications on file prior to encounter.  Magnesium Cl-Calcium Carbonate (SLOW-MAG) 71.5-119 MG TBEC, Take 2 tablets by mouth daily  ondansetron (ZOFRAN ODT) 4 MG ODT tab, Take 1 tablet (4 mg) by mouth every 6 hours as needed for nausea or vomiting            Review of Systems     A 12 point comprehensive review of systems was negative except as noted above in HPI.    PHYSICAL EXAMINATION       Vitals      Vitals: BP (!) 136/98   Pulse 81   Temp 98.4  F (36.9  C) (Temporal)   Resp 19   SpO2 100%   BMI= There is no height or weight on file to calculate BMI.      Examination     General Appearance:  Alert, cooperative, no distress  Head:    Normocephalic, without obvious abnormality, atraumatic  EENT:  PERRL, conjunctiva/corneas clear, EOM's intact.   Neck:   Supple, symmetrical, trachea midline, no adenopathy; no NVE  Back:  Symmetric, no curvature, no CVA tenderness  Chest/Lungs: Clear to auscultation bilaterally, respirations unlabored, No tenderness or deformity. No abdominal breathing or use of accessory muscles.   Heart:    Regular rate and rhythm, S1 and S2 normal, no murmur, rub   or gallop  Abdomen: Soft, non-tender, bowel sounds active all four quadrants, not peritoneal on palpation. Not distended  Extremities:  Extremities normal, atraumatic, no swelling   Skin:  Skin color, texture, turgor normal, no rashes or lesion  Neurologic:  Awake and alert, No lateralizing or localizing signs        Pertinent Lab     Results for orders placed or performed during the hospital encounter of 03/20/23   CT Head w/o Contrast    Impression    IMPRESSION:  1.  No acute  intracranial process.   CBC with platelets and differential   Result Value Ref Range    WBC Count 5.5 4.0 - 11.0 10e3/uL    RBC Count 4.26 (L) 4.40 - 5.90 10e6/uL    Hemoglobin 14.3 13.3 - 17.7 g/dL    Hematocrit 40.2 40.0 - 53.0 %    MCV 94 78 - 100 fL    MCH 33.6 (H) 26.5 - 33.0 pg    MCHC 35.6 31.5 - 36.5 g/dL    RDW 12.4 10.0 - 15.0 %    Platelet Count 155 150 - 450 10e3/uL    % Neutrophils 73 %    % Lymphocytes 13 %    % Monocytes 10 %    % Eosinophils 3 %    % Basophils 1 %    % Immature Granulocytes 0 %    NRBCs per 100 WBC 0 <1 /100    Absolute Neutrophils 4.0 1.6 - 8.3 10e3/uL    Absolute Lymphocytes 0.7 (L) 0.8 - 5.3 10e3/uL    Absolute Monocytes 0.5 0.0 - 1.3 10e3/uL    Absolute Eosinophils 0.2 0.0 - 0.7 10e3/uL    Absolute Basophils 0.1 0.0 - 0.2 10e3/uL    Absolute Immature Granulocytes 0.0 <=0.4 10e3/uL    Absolute NRBCs 0.0 10e3/uL   ECG 12-LEAD WITH MUSE (LHE)   Result Value Ref Range    Systolic Blood Pressure 140 mmHg    Diastolic Blood Pressure 91 mmHg    Ventricular Rate 80 BPM    Atrial Rate 80 BPM    VT Interval 100 ms    QRS Duration 92 ms     ms    QTc 468 ms    P Axis 50 degrees    R AXIS 54 degrees    T Axis 58 degrees    Interpretation ECG       Sinus rhythm with short VT  Otherwise normal ECG  No previous ECGs available  Confirmed by SEE ED PROVIDER NOTE FOR, ECG INTERPRETATION ([id number]),  GAY DE LEON ([id number]) on 3/20/2023 11:14:04 PM             Pertinent Radiology

## 2023-03-21 NOTE — ED TRIAGE NOTES
"Pt brought it by ambulance for evaluation of etoh seizure. Wife reports pt is \"heavy drinker\", usually daily or every other per patient but states he has been trying to cut back. Pt reports he was drinking heavily going into the weekend and had his last night late Friday. Wife has noticed that he has been having signs of withdrawal . States tonight around 2100 while pt was playing a videogame she noticed a sudden odd arm movement. When she asked the patient about it, she noticed that he was unresponsive, drooling, \"jerking\" of the feet, and noticed his arms were contracted. Believes this lasted about 30 seconds-1min. She then reports pt was unresponsive and snoring for about 5 minutes. Pt was awake but confused upon EMS arrival. Pt states he has gone through withdrawals before but has not had any seizures to his knowledge. Pt tremulous in hands. Reports minor HA. Denies nausea, vomiting, hallucinations, CP, SOB.      Triage Assessment     Row Name 03/20/23 6715       Triage Assessment (Adult)    Airway WDL WDL       Respiratory WDL    Respiratory WDL WDL       Cardiac WDL    Cardiac WDL WDL       Cognitive/Neuro/Behavioral WDL    Cognitive/Neuro/Behavioral WDL level of consciousness;orientation    Level of Consciousness alert    Orientation disoriented to;situation              "

## 2023-03-21 NOTE — ED PROVIDER NOTES
EMERGENCY DEPARTMENT ENCOUNTER      NAME: Aakash Mcclelland  AGE: 33 year old male  YOB: 1990  MRN: [id number]  EVALUATION DATE & TIME: 3/20/2023  9:40 PM    PCP: Ana Sanchez    ED PROVIDER: Elvia Valenzuela M.D.      Chief Complaint   Patient presents with     Seizures     Alcohol Problem         FINAL IMPRESSION:  1. Alcohol withdrawal seizure without complication (H)          ED COURSE & MEDICAL DECISION MAKING:    ED Course as of 03/20/23 2338   Mon Mar 20, 2023   2234 Patient A&O x3 but slightly shaky and s/p tonic clonic unresponsive episode while stopping drinking, given thiamine/folic acid/ativan and amenable to admission with high risk EtOH withdrawal, wife at bedside amenable to plan.   2316 Head CT reassuringly WNL, CBC normal, VS improving overall, ativan and folic acid and thiamine and IVF begun, admitting physician paged   2331 Patient endorsed to hospitalist Lisa to med surg       Pertinent Labs & Imaging studies reviewed. (See chart for details)    N95 worn  A face shield was worn also  COVID PPE    Medical Decision Making    History:    Supplemental history from: Documented in chart, if applicable, Family Member/Significant Other and EMS    External Record(s) reviewed: Documented in chart, if applicable.    Work Up:    Chart documentation includes differential considered and any EKGs or imaging independently interpreted by provider, where specified.    In additional to work up documented, I considered the following work up: Documented in chart, if applicable.    External consultation:    Discussion of management with another provider: Documented in chart, if applicable    Complicating factors:    Care impacted by chronic illness: N/A    Care affected by social determinants of health: Alcohol Abuse and/or Recreational Drug Use    Disposition considerations: Admit.    Critical Care time was 45 minutes for this patient excluding procedures.      At the conclusion of the encounter  "I discussed the results of all of the tests and the disposition. The questions were answered. The patient or family acknowledged understanding and was agreeable with the care plan.     MEDICATIONS GIVEN IN THE EMERGENCY:  Medications   thiamine (B-1) 500 mg in sodium chloride 0.9 % 50 mL intermittent infusion (0 mg Intravenous Stopped 3/20/23 2331)   folic acid (FOLVITE) tablet 1 mg (1 mg Oral $Given 3/20/23 2239)   LORazepam (ATIVAN) injection 1 mg (1 mg Intravenous $Given 3/20/23 2239)   0.9% sodium chloride BOLUS (1,000 mLs Intravenous $New Bag 3/20/23 2235)       NEW PRESCRIPTIONS STARTED AT TODAY'S ER VISIT  New Prescriptions    No medications on file          =================================================================    HPI      Aakash Mcclelland is a 33 year old male with PMHx of alcoholism who presents to the ED today via EMS and with wife at bedside with alcohol withdrawal.    Per patient and his wife, at 2100 at home while sitting and playing video game he had jerking of arms/legs and unresponsive 30 seconds, then confused/somnolent/snoring 5 minutes, then awake/more confused when EMS arrived, now improved and back to baseline. He did not urinate or stool himself or bite his tongue, he denies pain or recent trauma/falls, did not fall when this occurred as he was in a fercho chair on the floor.    He has no history of epilepsy/seizures, no medications were administered.     He reports \"I was playing video games and then I woke up in an ambulance.\" No over the counter medications or drugs. Last alcohol Friday night, was drinking a large amount of alcohol before 500 mL liquor nightly. No prior admission for DTs or seizures. He did have some nausea this weekend and drank some beer this weekend with hopes to wean him off alcohol/taper down. He stopped drinking because he wants to quit substantial alcohol use. He does get jittery when he stops drinking usually.      REVIEW OF SYSTEMS   All other systems " reviewed and are negative except as noted above in HPI.    PAST MEDICAL HISTORY:  No past medical history on file.    PAST SURGICAL HISTORY:  Past Surgical History:   Procedure Laterality Date     IR MISCELLANEOUS PROCEDURE  7/27/2009     IR MISCELLANEOUS PROCEDURE  2/26/2012       CURRENT MEDICATIONS:    Magnesium Cl-Calcium Carbonate (SLOW-MAG) 71.5-119 MG TBEC  ondansetron (ZOFRAN ODT) 4 MG ODT tab        ALLERGIES:  Allergies   Allergen Reactions     Cephalosporins Rash       FAMILY HISTORY:  No family history on file.    SOCIAL HISTORY:   Social History     Socioeconomic History     Marital status:        VITALS:  Patient Vitals for the past 24 hrs:   BP Temp Temp src Pulse Resp SpO2   03/20/23 2230 (!) 136/98 -- -- 81 19 100 %   03/20/23 2147 (!) 140/91 98.4  F (36.9  C) Temporal 90 18 98 %       PHYSICAL EXAM    GENERAL: Awake, alert.  In no acute distress.   HEENT: Normocephalic, atraumatic.  Pupils equal, round and reactive.  Conjunctiva normal.  EOMI.  NECK: No stridor or apparent deformity.  PULMONARY: Symmetrical breath sounds without distress.  Lungs clear to auscultation bilaterally without wheezes, rhonchi or rales.  CARDIO: Regular rate and rhythm.  No significant murmur, rub or gallop.  Radial pulses strong and symmetrical.  ABDOMINAL: Abdomen soft, non-distended and non-tender to palpation.  No CVAT, no palpable hepatosplenomegaly.  EXTREMITIES: No lower extremity swelling or edema.    NEURO: Alert and oriented to person, place and time.  Cranial nerves grossly intact.  No focal motor deficit.  PSYCH: Normal mood and affect  SKIN: No rashes      LAB:  All pertinent labs reviewed and interpreted.  Results for orders placed or performed during the hospital encounter of 03/20/23   CT Head w/o Contrast    Impression    IMPRESSION:  1.  No acute intracranial process.   Comprehensive metabolic panel   Result Value Ref Range    Sodium 139 136 - 145 mmol/L    Potassium 3.0 (L) 3.5 - 5.0 mmol/L     Chloride 97 (L) 98 - 107 mmol/L    Carbon Dioxide (CO2) 27 22 - 31 mmol/L    Anion Gap 15 5 - 18 mmol/L    Urea Nitrogen 8 8 - 22 mg/dL    Creatinine 0.70 0.70 - 1.30 mg/dL    Calcium 8.9 8.5 - 10.5 mg/dL    Glucose 91 70 - 125 mg/dL    Alkaline Phosphatase 105 45 - 120 U/L     (H) 0 - 40 U/L     (H) 0 - 45 U/L    Protein Total 7.1 6.0 - 8.0 g/dL    Albumin 4.1 3.5 - 5.0 g/dL    Bilirubin Total 1.7 (H) 0.0 - 1.0 mg/dL    GFR Estimate >90 >60 mL/min/1.73m2   Result Value Ref Range    Lipase 46 0 - 52 U/L   Ethyl Alcohol Level   Result Value Ref Range    Alcohol, Blood <10 None detected mg/dL   CBC with platelets and differential   Result Value Ref Range    WBC Count 5.5 4.0 - 11.0 10e3/uL    RBC Count 4.26 (L) 4.40 - 5.90 10e6/uL    Hemoglobin 14.3 13.3 - 17.7 g/dL    Hematocrit 40.2 40.0 - 53.0 %    MCV 94 78 - 100 fL    MCH 33.6 (H) 26.5 - 33.0 pg    MCHC 35.6 31.5 - 36.5 g/dL    RDW 12.4 10.0 - 15.0 %    Platelet Count 155 150 - 450 10e3/uL    % Neutrophils 73 %    % Lymphocytes 13 %    % Monocytes 10 %    % Eosinophils 3 %    % Basophils 1 %    % Immature Granulocytes 0 %    NRBCs per 100 WBC 0 <1 /100    Absolute Neutrophils 4.0 1.6 - 8.3 10e3/uL    Absolute Lymphocytes 0.7 (L) 0.8 - 5.3 10e3/uL    Absolute Monocytes 0.5 0.0 - 1.3 10e3/uL    Absolute Eosinophils 0.2 0.0 - 0.7 10e3/uL    Absolute Basophils 0.1 0.0 - 0.2 10e3/uL    Absolute Immature Granulocytes 0.0 <=0.4 10e3/uL    Absolute NRBCs 0.0 10e3/uL   ECG 12-LEAD WITH MUSE (LHE)   Result Value Ref Range    Systolic Blood Pressure 140 mmHg    Diastolic Blood Pressure 91 mmHg    Ventricular Rate 80 BPM    Atrial Rate 80 BPM    DE Interval 100 ms    QRS Duration 92 ms     ms    QTc 468 ms    P Axis 50 degrees    R AXIS 54 degrees    T Axis 58 degrees    Interpretation ECG       Sinus rhythm with short DE  Otherwise normal ECG  No previous ECGs available  Confirmed by SEE ED PROVIDER NOTE FOR, ECG INTERPRETATION ([id number]),  HALEY  GAY ([id number]) on 3/20/2023 11:14:04 PM         RADIOLOGY:  Reviewed all pertinent imaging. Please see official radiology report.  CT Head w/o Contrast   Final Result   IMPRESSION:   1.  No acute intracranial process.               Elvia Valenzuela MD  03/20/23 0787

## 2023-03-21 NOTE — PHARMACY-ADMISSION MEDICATION HISTORY
Pharmacy Note - Admission Medication History    Pertinent Provider Information: Patient has been using an older zofran prescription. Reported only having one tablet left at home.      ______________________________________________________________________    Prior To Admission (PTA) med list completed and updated in EMR.       PTA Med List   Medication Sig Last Dose     multivitamin w/minerals (MULTI-VITAMIN) tablet Take 1 tablet by mouth daily 3/20/2023 at am     ondansetron (ZOFRAN ODT) 4 MG ODT tab Take 1 tablet (4 mg) by mouth every 6 hours as needed for nausea or vomiting Past Week at prn       Information source(s): Patient  Method of interview communication: in-person      Patient was asked about OTC/herbal products specifically.  PTA med list reflects this.    In the past week, patient estimated taking medication this percent of the time:  greater than 90%.    Medication Affordability:       Allergies were reviewed, assessed, and updated with the patient.      Patient does not anticipate needing any multi-use medications during admission.    The information provided in this note is only as accurate as the sources available at the time of the update(s).    Thank you for the opportunity to participate in the care of this patient.    REYMUNDO SLATER  3/21/2023 7:51 AM

## 2023-03-21 NOTE — PROGRESS NOTES
[facility]    Medicine Progress Note - Hospitalist Service    Date of Admission:  3/20/2023    Assessment & Plan   Aakash Mcclelland is a 33 year old male admitted for alcohol withdrawal complicated by seizure.  He is currently clinically stable and symptomatically improved.  Continue on CIWA.  He has mild associated hepatitis, stable since admission.  There is fatty liver disease noted on US.  He will need outpatient GI followup.    # Alcohol withdrawal w/seizure  - CIWA  - patient expresses a preference for outpatient treatment to support sobriety  - discontinue gabapentin, clonidine  - prn haloperidol and olanzapine discontinued, not appropriate for use in complicated withdrawal    # Alcoholic hepatitis, mild  # Fatty liver disease  - RUQ US  - check MELD labs in AM  - outpatient GI referral    # Hypokalemia  - trend, replace as needed       Diet: Combination Diet Regular Diet Adult    Hatch Catheter: Not present  Lines: None     Cardiac Monitoring: None  Code Status: Full Code      Clinically Significant Risk Factors Present on Admission        # Hypokalemia: Lowest K = 3 mmol/L in last 2 days, will replace as needed     # Hypomagnesemia: Lowest Mg = 1.2 mg/dL in last 2 days, will replace as needed                    Disposition Plan      Expected Discharge Date: 03/22/2023                  Poli Anne MD  Hospitalist Service  [facility]  Securely message with Spiration (more info)  Text page via [employer] Paging/Directory   ______________________________________________________________________    Interval History   The patient reports he is more steady on his feet.  Vomiting has improved.  He is tolerating a diet.  Headache has resolved.  Sweats having improved as well.  Denies chest pian/pressure, or fevers.  Reports he had auditory hallucinations which have resolved.  Denies visual hallucinations.  He has had difficulty sleeping as he usually drinks alcohol to  help him sleep.  He is interested in pursuing outpatient treatment to support sobriety.    Physical Exam   Vital Signs: Temp: 98.4  F (36.9  C) Temp src: Oral BP: 111/64 Pulse: 71   Resp: 22 SpO2: 97 % O2 Device: None (Room air)    Weight: 135 lbs 0 oz    Gen: sitting comfortably in bed, nad  CV: nl rate, regular rhythm  Pulm: no acute resp distress, ctab  GI: abdomen soft, NT, ND  Neuro: alert, conversant, fine bilateral hand tremor    Medical Decision Making             Data   Na 138  K 4.1  BUN 5  Cr 0.65        T bili 2.1    WBC 6.5  Hgb 13  Plts 148

## 2023-03-22 VITALS
BODY MASS INDEX: 20.46 KG/M2 | OXYGEN SATURATION: 100 % | TEMPERATURE: 98.4 F | RESPIRATION RATE: 18 BRPM | WEIGHT: 135 LBS | SYSTOLIC BLOOD PRESSURE: 128 MMHG | DIASTOLIC BLOOD PRESSURE: 94 MMHG | HEART RATE: 76 BPM | HEIGHT: 68 IN

## 2023-03-22 LAB
ALBUMIN SERPL-MCNC: 4 G/DL (ref 3.5–5)
ALP SERPL-CCNC: 97 U/L (ref 45–120)
ALT SERPL W P-5'-P-CCNC: 334 U/L (ref 0–45)
ANION GAP SERPL CALCULATED.3IONS-SCNC: 8 MMOL/L (ref 5–18)
AST SERPL W P-5'-P-CCNC: 383 U/L (ref 0–40)
BILIRUB SERPL-MCNC: 1.7 MG/DL (ref 0–1)
BUN SERPL-MCNC: 5 MG/DL (ref 8–22)
CALCIUM SERPL-MCNC: 9.2 MG/DL (ref 8.5–10.5)
CHLORIDE BLD-SCNC: 106 MMOL/L (ref 98–107)
CO2 SERPL-SCNC: 25 MMOL/L (ref 22–31)
CREAT SERPL-MCNC: 0.66 MG/DL (ref 0.7–1.3)
GFR SERPL CREATININE-BSD FRML MDRD: >90 ML/MIN/1.73M2
GLUCOSE BLD-MCNC: 75 MG/DL (ref 70–125)
INR PPP: 1.04 (ref 0.85–1.15)
MAGNESIUM SERPL-MCNC: 1.8 MG/DL (ref 1.8–2.6)
POTASSIUM BLD-SCNC: 3.6 MMOL/L (ref 3.5–5)
PROT SERPL-MCNC: 6.8 G/DL (ref 6–8)
SODIUM SERPL-SCNC: 139 MMOL/L (ref 136–145)

## 2023-03-22 PROCEDURE — 99239 HOSP IP/OBS DSCHRG MGMT >30: CPT | Performed by: INTERNAL MEDICINE

## 2023-03-22 PROCEDURE — 85610 PROTHROMBIN TIME: CPT | Performed by: HOSPITALIST

## 2023-03-22 PROCEDURE — 36415 COLL VENOUS BLD VENIPUNCTURE: CPT | Performed by: HOSPITALIST

## 2023-03-22 PROCEDURE — 83735 ASSAY OF MAGNESIUM: CPT | Performed by: INTERNAL MEDICINE

## 2023-03-22 PROCEDURE — 250N000013 HC RX MED GY IP 250 OP 250 PS 637: Performed by: INTERNAL MEDICINE

## 2023-03-22 PROCEDURE — 80053 COMPREHEN METABOLIC PANEL: CPT | Performed by: HOSPITALIST

## 2023-03-22 RX ORDER — LANOLIN ALCOHOL/MO/W.PET/CERES
100 CREAM (GRAM) TOPICAL DAILY
Qty: 10 TABLET | Refills: 0 | Status: SHIPPED | OUTPATIENT
Start: 2023-03-23 | End: 2023-04-02

## 2023-03-22 RX ORDER — FOLIC ACID 1 MG/1
1 TABLET ORAL DAILY
Qty: 10 TABLET | Refills: 0 | Status: SHIPPED | OUTPATIENT
Start: 2023-03-23 | End: 2023-05-17

## 2023-03-22 RX ADMIN — Medication 5 MG: at 01:33

## 2023-03-22 RX ADMIN — Medication 100 MG: at 08:53

## 2023-03-22 RX ADMIN — FOLIC ACID 1 MG: 1 TABLET ORAL at 08:53

## 2023-03-22 RX ADMIN — MULTIPLE VITAMINS W/ MINERALS TAB 1 TABLET: TAB at 08:53

## 2023-03-22 ASSESSMENT — ACTIVITIES OF DAILY LIVING (ADL)
ADLS_ACUITY_SCORE: 37

## 2023-03-22 NOTE — PLAN OF CARE
Goal Outcome Evaluation:    Patient discharging home. VSS. IV removed prior to discharge. Discharge AVS and resources sent with patient in regards to alcohol. All questions answered. Wife present with discharge education.     Elly Rodriguez RN  3/22/2023

## 2023-03-22 NOTE — DISCHARGE INSTRUCTIONS
Substance Use Disorder Direct Access Resources    It is recommended that you abstain from all mood altering chemicals. Please contact the sober support hotline ([phone]) as needed; phones are answered 24 hours a day, 7 days a week.    To access substance use treatment you must have a comprehensive assessment completed to begin any treatment program.     If uninsured, please contact your county of residence for eligibility screen to substance use disorder evaluation and treatment:    [place] - [phone]   [place] - [phone]   [facility] [phone]   [place] - [phone]   [place]  [phone]   [place] - [phone]   [facility] [phone]   [place] - [phone]     If you have private insurance, call the customer service number on the back of your insurance card to find an in-network substance abuse use disorder assessment. The ideal provider will be a treatment facility, licensed in the [facility].     Community VIANCA Evaluations: Clients may call their [facility] for a full list of providers - Availability and services listed belo are subject to change, please call the provider to confirm    [facility]  [phone]  [facility] [place], [place], [address]  *Please call the above number to schedule a comprehensive assessment for determination of level of care needs. In person and virtual appointments available Mon-Fri.    [facility], [facility] [address] Floor, [address], [place] [address] (next to the outpatient lab)    Phone: [phone]   Provides bridging services to people with Opiate Use Disorders (OUD) seeking care. This is a front door to Medication Assisted Treatments (MAT), ages 16+  Walk In hours: Monday-Friday 9:00am-3:00pm    [facility]  [phone]  Walk in Assessments: Mon-Friday 7a-1:45p  [address], [place], [address]    [facility]  Central Access [phone]  [address] [place] [address]  [place], [place], [address]  *by appointment only    [facility]  [phone] (phone consultation available )  Locations in: [place], [place], [facility], and [place], [place]  [place] virtual IOP [phone] or visit [email or web address]   Also offers LGBTQ programming     [facility]  [phone]  [address] [facility] [address] [facility]  [place], [place], [address]  *Currently only offered via telehealth - call to set up an appointment    [facility]  [address] [place], [place], [address]  [facility]  For more information to to make a referral call:  [phone]  Walk-in on   9-11 a.m.    [facility]  [phone]  [address] [place], [place], [address]  *available by appointments only    [facility]  [phone]  [address] [place], [place], [address]  *available by appointment only    [facility]  [phone]  [address] [place], [place], [address]  *walk in assessments available M-F starting at 7 am.    [facility]  [phone]  Locations: [facility], [facility], and [place]  *Walk in assessments availble M-F starting at 8 am -virtual only    [facility]  [phone]  [address] [place] [address]    [facility]  Virtual + Locations: [place], [place], [place], [place], [facility], [place], [place], [place]   [phone]  *available by appointment only    [facility]  [phone]  [address]  [place], [place], [address]    [facility]  [phone]  [address] [place] [place], [address]  *available by appointment    [facility]  [phone]  [address] [place], [place], [address]  *walk ins available M-TH from 9-3    [facility] program: [phone]  [address]  [place], [place], [address]    [facility]  [phone]  Same day substance use disorder assessments are available Monday - Friday, via walk-in or by appointment at the [place] location.  [address], [address], [place] [address]     [facility] - adolescent and adult SUDs services *Referral made here for chemical health assessment and follow up to establish plan in the community.  [phone]  Offer services Monday through Friday, as well as evening hours Monday through Thursday. Normally, a first appointment will be scheduled within one week  [email or web address]  Locations all over [place]    If you are intoxicated, you may be required to detox at a detox facility before starting treatment. The following are detox facilities that you can self present to. All detox facilities are able to help you complete an assessment prior to discharge if you choose:    [facility]: Arrive at a [facility] for immediate medical evaluation    [facility]: [address] [place], [place], [address].         [phone]    [facility]: [address] [place], [place], [address]  [phone]     [facility]): [address] [place], [place], [address] [phone]     [facility]: [address] [facility] [place], [place], [address], [phone]         Ways to help cope with sobriety:    -- Take prescribed medicines as scheduled  -- Keep follow-up appointments  -- Talk to others about your concerns  -- Get regular exercise  -- Practice deep breathing skills  -- Eat a healthy diet  -- Use community resources, including hotline numbers, [facility] crisis and support meetings  -- Stay sober and avoid places/people/things associated with substance use  --Maintain a daily schedule/routine  --Get at least 7-8 hours of sleep per night  --Create a list 10--20 healthy activities that you can do that are enjoyable and do not involve substance  use  --Create daily goals (approx. 1-4 goals) per day and work to achieve them throughout the day.       Free Resources:    [facility] Connection ([facility])  [facility] connects people seeking recovery to resources that help foster and sustain long-term recovery. Whether you are seeking resources for treatment, transportation, housing, job training, education, health care or other pathways to recovery, [facility] is a great place to start.  Phone: [phone]. www.[employer]etx (Great listing of all types of recovery and non-recovery related resources)    Alcoholics Anonymous  Phone: 2-278-ALCOHOL  Website: HTTP://WWW.AA.ORG/  [facility] [place] (228-097-2422 or http://aaBubok.org)  [facility] [place] (507-900-3624 or www.aastpaul.org)     Narcotics Anonymous  Phone: 788.300.6455  Website: www.[employer].    [employer] [address], [place], [place],  Phone: [phone]  Drop-in Hours: Monday-Friday 9-11:30 am. By appointment at other times.  Provides: [facility] is a drop-in center on the east side of [place] that provides a safe space for individuals who are homeless and have a history of chemical use. Sobriety is not a requirement but drugs and alcohol are not allowed on the property.  Services: Non-clients can access drop-in services such as Recovery and Harm Reduction Groups, referrals to case management, community activities, shower facilities, and a pool table. Individuals who are homeless and have chemical health needs may be eligible for enrollment into [facility]'s case management program. Clients and  work together to access benefits, treatment, health care, shelter, and external housing resources.

## 2023-03-22 NOTE — PLAN OF CARE
Goal Outcome Evaluation:      Problem: Plan of Care - These are the overarching goals to be used throughout the patient stay.    Goal: Plan of Care Review  Description: The Plan of Care Review/Shift note should be completed every shift.  The Outcome Evaluation is a brief statement about your assessment that the patient is improving, declining, or no change.  This information will be displayed automatically on your shift note.  Outcome: Progressing     Problem: Alcohol Withdrawal  Goal: Alcohol Withdrawal Symptom Control  Outcome: Progressing          Patient denies pain.  CIWA scores 3,17,6 and 2.  Pt received Ativan 2 mg po x1 related to tremors, agitation and visual hallucinations.   Vitals stable.  Tele: NSR.

## 2023-03-22 NOTE — CONSULTS
SW met with Pt to discuss CD resources.  Pt reports he has not been to treatment in the past.  Pt works full time, reports he would be interested in an OP program vs IP depending on recommendations.  Pt is interested in completing a chemical health assessment.  SW reached out to CD eval counselor to determine when Pt could be seen vs completing assessment OP.      Community resources provided in Pt's AVS.     SW met with Pt again.  Pt in agreement with completing chemical health assessment outpatient.  Pt and wife both confirm feeling comfortable with plan to discharge home and complete assessment in the community.   Pt agreeable to referral to [facility].     MIRIAM Lujan

## 2023-03-22 NOTE — ED NOTES
Pt found at front door of ER with wife. Pt stating he is wanting to leave. Pt walked back to [facility] bed D without difficulty. MD paged in regards to patient wanting to leave. ...Elly Rodriguez RN

## 2023-03-22 NOTE — DISCHARGE SUMMARY
[facility]  Hospitalist Discharge Summary      Date of Admission:  3/20/2023  Date of Discharge:  3/22/2023  Discharging Provider: Johnathan Riddle DO  Discharge Service: Hospitalist Service    Discharge Diagnoses   Complicated alcohol withdrawal with seizure  Alcohol hepatitis  Fatty liver disease  Hypokalemia.     Follow-ups Needed After Discharge   Follow-up Appointments     Follow-up and recommended labs and tests       Follow up with primary care provider, Ana Sanchez, within 7 days for   hospital follow- up.  The following labs/tests are recommended: hepatic   panel.    Follow up with [facility] hepatology, within 4 to 6 weeks alcohol induced liver   injury, fatty liver.             Unresulted Labs Ordered in the Past 30 Days of this Admission     Date and Time Order Name Status Description    3/22/2023 12:02 AM Comprehensive metabolic panel In process     3/22/2023 12:02 AM INR In process         Discharge Disposition   Discharged to home  Condition at discharge: Stable    Hospital Course   33-year-old male admitted for alcohol withdrawal complicated by seizure at his home.  Patient had incidental elevation in liver transaminases which were trending down but still elevated at time of discharge.  Patient had hypokalemia and hypomagnesemia which improved with electrolyte replacement protocol.  CIWA protocol was initiated.  During hospitalization patient complained of anxiety auditory hallucinations and tremor.  This morning patient feels much better.  His last drink was on 3/17/2023.  Patient was advised cessation of alcohol use and complete sobriety.  Patient was recommended referral to hepatologist.  Social work provided patient with resources for alcohol sobriety counseling and support.    Consultations This Hospital Stay   PHARMACY IP CONSULT  CARE MANAGEMENT / SOCIAL WORK IP CONSULT    Code Status   Full Code    Time Spent on this Encounter   I, Johnathan Riddle DO, personally  saw the patient today and spent greater than 30 minutes discharging this patient.       DO CHRIS Garcia [facility] EMERGENCY ROOM  [address] [address]  Phone: [phone]  Fax: [phone]  ______________________________________________________________________    Physical Exam   Vital Signs: Temp: 98.4  F (36.9  C) Temp src: Oral BP: (!) 128/94 Pulse: 76   Resp: 18 SpO2: 100 % O2 Device: None (Room air)    Weight: 135 lbs 0 oz  General Appearance:  Alert, cooperative, no distress  Head:    Normocephalic, without obvious abnormality, atraumatic  EENT:  PERRL, conjunctiva/corneas clear, EOM's intact.   Neck:    Supple, symmetrical, trachea midline, no adenopathy; no NVE  Back:  Symmetric, no curvature, no CVA tenderness  Chest/Lungs: Clear to auscultation bilaterally, respirations unlabored, No tenderness or deformity. No abdominal breathing or use of accessory muscles.   Heart:    Regular rate and rhythm, S1 and S2 normal, no murmur, rub   or gallop  Abdomen: Soft, non-tender, bowel sounds active all four quadrants, not peritoneal on palpation. Not distended  Extremities:  Extremities normal, atraumatic, no swelling   Skin:  Skin color, texture, turgor normal, no rashes or lesion  Neurologic:  Awake and alert, No lateralizing or localizing signs         Primary Care Physician   Ana Sanchez    Discharge Orders      Adult GI  Referral - Consult Only      Reason for your hospital stay    Alcohol dependence, with alcohol withdrawal seizure.     Follow-up and recommended labs and tests     Follow up with primary care provider, Ana Sanchez, within 7 days for hospital follow- up.  The following labs/tests are recommended: hepatic panel.    Follow up with [facility] hepatology, within 4 to 6 weeks alcohol induced liver injury, fatty liver.     Activity    Your activity upon discharge: no driving while drinking.     When to contact your care team     Call your primary doctor if you have any of the following: seizure.     Diet    Follow this diet upon discharge: Orders Placed This Encounter      Combination Diet Regular Diet Adult       Significant Results and Procedures   Most Recent 3 CBC's:Recent Labs   Lab Test 03/21/23  0612 03/20/23 2258 09/07/22  2039   WBC 6.5 5.5 6.8   HGB 13.2* 14.3 16.1   MCV 97 94 92   * 155 199     Most Recent 2 LFT's:Recent Labs   Lab Test 03/21/23  0612 03/20/23  2258   * 450*   * 313*   ALKPHOS 88 105   BILITOTAL 2.1* 1.7*     Results for orders placed or performed during the hospital encounter of 03/20/23   CT Head w/o Contrast    Narrative    EXAM: CT HEAD W/O CONTRAST  LOCATION: [facility]  DATE/TIME: 3/20/2023 10:49 PM    INDICATION: First seizure  COMPARISON: None.  TECHNIQUE: Routine CT Head without IV contrast. Multiplanar reformats. Dose reduction techniques were used.  FINDINGS:  INTRACRANIAL CONTENTS: No intracranial hemorrhage, extraaxial collection, or mass effect.  No CT evidence of acute infarct. Normal parenchymal attenuation. Mild generalized volume loss. No hydrocephalus.     VISUALIZED ORBITS/SINUSES/MASTOIDS: No intraorbital abnormality. No paranasal sinus mucosal disease. No middle ear or mastoid effusion.  BONES/SOFT TISSUES: No acute abnormality.      Impression    IMPRESSION:  1.  No acute intracranial process.   US Abdomen Limited    Narrative    EXAM: US ABDOMEN LIMITED  LOCATION: [facility]  DATE/TIME: 3/21/2023 4:43 PM    INDICATION: RUQ US to assess for hepatobiliary disease. Seizure related alcohol withdrawal. Transaminitis.  COMPARISON: None.  TECHNIQUE: Limited abdominal ultrasound.    FINDINGS:  GALLBLADDER: Contracted due to nonfasting state. No stones identified.  BILE DUCTS: No biliary dilatation. The common duct measures 3 mm.  LIVER: Increased echogenicity from diffuse fatty infiltration. No focal mass.  RIGHT KIDNEY: No  hydronephrosis.  PANCREAS: The visualized portions are normal.  No ascites.      Impression    IMPRESSION:  1.  Diffuse fatty infiltration of liver.             Discharge Medications   Current Discharge Medication List      START taking these medications    Details   folic acid (FOLVITE) 1 MG tablet Take 1 tablet (1 mg) by mouth daily  Qty: 10 tablet, Refills: 0    Associated Diagnoses: Alcoholic hepatitis without ascites      melatonin 5 MG tablet Take 1 tablet (5 mg) by mouth every evening as needed for sleep  Qty: 14 tablet, Refills: 0    Associated Diagnoses: Alcoholic hepatitis without ascites      thiamine (B-1) 100 MG tablet Take 1 tablet (100 mg) by mouth daily for 10 days  Qty: 10 tablet, Refills: 0    Associated Diagnoses: Alcoholic hepatitis without ascites         CONTINUE these medications which have NOT CHANGED    Details   multivitamin w/minerals (MULTI-VITAMIN) tablet Take 1 tablet by mouth daily      ondansetron (ZOFRAN ODT) 4 MG ODT tab Take 1 tablet (4 mg) by mouth every 6 hours as needed for nausea or vomiting  Qty: 12 tablet, Refills: 0           Allergies   Allergies   Allergen Reactions     Cephalosporins Rash

## 2023-03-29 ENCOUNTER — LAB REQUISITION (OUTPATIENT)
Dept: LAB | Facility: CLINIC | Age: 33
End: 2023-03-29
Payer: COMMERCIAL

## 2023-03-29 DIAGNOSIS — F10.90 ALCOHOL USE, UNSPECIFIED, UNCOMPLICATED: ICD-10-CM

## 2023-03-29 LAB
ALBUMIN SERPL BCG-MCNC: 4.6 G/DL (ref 3.5–5.2)
ALP SERPL-CCNC: 128 U/L (ref 40–129)
ALT SERPL W P-5'-P-CCNC: 471 U/L (ref 10–50)
AST SERPL W P-5'-P-CCNC: 262 U/L (ref 10–50)
BILIRUB DIRECT SERPL-MCNC: <0.2 MG/DL (ref 0–0.3)
BILIRUB SERPL-MCNC: 0.4 MG/DL
PROT SERPL-MCNC: 6.8 G/DL (ref 6.4–8.3)

## 2023-03-29 PROCEDURE — 80076 HEPATIC FUNCTION PANEL: CPT | Mod: ORL | Performed by: PHYSICIAN ASSISTANT

## 2023-05-01 ENCOUNTER — LAB REQUISITION (OUTPATIENT)
Dept: LAB | Facility: CLINIC | Age: 33
End: 2023-05-01
Payer: COMMERCIAL

## 2023-05-01 DIAGNOSIS — F10.11 ALCOHOL ABUSE, IN REMISSION: ICD-10-CM

## 2023-05-01 DIAGNOSIS — K70.9 ALCOHOLIC LIVER DISEASE, UNSPECIFIED (H): ICD-10-CM

## 2023-05-01 DIAGNOSIS — E51.9 THIAMINE DEFICIENCY, UNSPECIFIED: ICD-10-CM

## 2023-05-01 DIAGNOSIS — E53.8 DEFICIENCY OF OTHER SPECIFIED B GROUP VITAMINS: ICD-10-CM

## 2023-05-01 LAB
ALBUMIN SERPL BCG-MCNC: 4.6 G/DL (ref 3.5–5.2)
ALP SERPL-CCNC: 110 U/L (ref 40–129)
ALT SERPL W P-5'-P-CCNC: 282 U/L (ref 10–50)
ANION GAP SERPL CALCULATED.3IONS-SCNC: 17 MMOL/L (ref 7–15)
AST SERPL W P-5'-P-CCNC: 178 U/L (ref 10–50)
BILIRUB SERPL-MCNC: 0.3 MG/DL
BUN SERPL-MCNC: 6.4 MG/DL (ref 6–20)
CALCIUM SERPL-MCNC: 9.6 MG/DL (ref 8.6–10)
CHLORIDE SERPL-SCNC: 105 MMOL/L (ref 98–107)
CREAT SERPL-MCNC: 0.7 MG/DL (ref 0.67–1.17)
DEPRECATED HCO3 PLAS-SCNC: 24 MMOL/L (ref 22–29)
FOLATE SERPL-MCNC: 11.6 NG/ML (ref 4.6–34.8)
GFR SERPL CREATININE-BSD FRML MDRD: >90 ML/MIN/1.73M2
GLUCOSE SERPL-MCNC: 73 MG/DL (ref 70–99)
POTASSIUM SERPL-SCNC: 3.9 MMOL/L (ref 3.4–5.3)
PROT SERPL-MCNC: 7 G/DL (ref 6.4–8.3)
SODIUM SERPL-SCNC: 146 MMOL/L (ref 136–145)
VIT B12 SERPL-MCNC: 759 PG/ML (ref 232–1245)

## 2023-05-01 PROCEDURE — 82607 VITAMIN B-12: CPT | Mod: ORL | Performed by: PHYSICIAN ASSISTANT

## 2023-05-01 PROCEDURE — 82746 ASSAY OF FOLIC ACID SERUM: CPT | Mod: ORL | Performed by: PHYSICIAN ASSISTANT

## 2023-05-01 PROCEDURE — 84425 ASSAY OF VITAMIN B-1: CPT | Mod: ORL | Performed by: PHYSICIAN ASSISTANT

## 2023-05-01 PROCEDURE — 80053 COMPREHEN METABOLIC PANEL: CPT | Mod: ORL | Performed by: PHYSICIAN ASSISTANT

## 2023-05-01 PROCEDURE — 84207 ASSAY OF VITAMIN B-6: CPT | Mod: ORL | Performed by: PHYSICIAN ASSISTANT

## 2023-05-04 LAB
PYRIDOXAL PHOS SERPL-SCNC: 38.1 NMOL/L
VIT B1 PYROPHOSHATE BLD-SCNC: 146 NMOL/L

## 2023-05-17 ENCOUNTER — HOSPITAL ENCOUNTER (OUTPATIENT)
Facility: CLINIC | Age: 33
Setting detail: OBSERVATION
Discharge: HOME | End: 2023-05-19
Attending: EMERGENCY MEDICINE | Admitting: EMERGENCY MEDICINE
Payer: COMMERCIAL

## 2023-05-17 DIAGNOSIS — F10.930 ALCOHOL WITHDRAWAL SEIZURE WITHOUT COMPLICATION (H): ICD-10-CM

## 2023-05-17 DIAGNOSIS — R56.9 ALCOHOL WITHDRAWAL SEIZURE WITHOUT COMPLICATION (H): ICD-10-CM

## 2023-05-17 DIAGNOSIS — E88.89 ALCOHOLIC KETOSIS (H): ICD-10-CM

## 2023-05-17 DIAGNOSIS — F10.939 ALCOHOL WITHDRAWAL SYNDROME WITH COMPLICATION (H): ICD-10-CM

## 2023-05-17 DIAGNOSIS — K70.10 ALCOHOLIC HEPATITIS WITHOUT ASCITES (H): Primary | ICD-10-CM

## 2023-05-17 LAB
ALBUMIN SERPL-MCNC: 5.3 G/DL (ref 3.5–5)
ALBUMIN UR-MCNC: 50 MG/DL
ALP SERPL-CCNC: 111 U/L (ref 45–120)
ALT SERPL W P-5'-P-CCNC: 158 U/L (ref 0–45)
AMPHETAMINES UR QL SCN: NORMAL
ANION GAP SERPL CALCULATED.3IONS-SCNC: 34 MMOL/L (ref 5–18)
APPEARANCE UR: CLEAR
AST SERPL W P-5'-P-CCNC: 204 U/L (ref 0–40)
ATRIAL RATE - MUSE: 121 BPM
BARBITURATES UR QL: NORMAL
BASE EXCESS BLDV CALC-SCNC: -4.8 MMOL/L
BASOPHILS # BLD AUTO: 0 10E3/UL (ref 0–0.2)
BASOPHILS NFR BLD AUTO: 0 %
BENZODIAZ UR QL: NORMAL
BILIRUB SERPL-MCNC: 1.9 MG/DL (ref 0–1)
BILIRUB UR QL STRIP: NEGATIVE
BUN SERPL-MCNC: 6 MG/DL (ref 8–22)
CALCIUM SERPL-MCNC: 10.8 MG/DL (ref 8.5–10.5)
CANNABINOIDS UR QL SCN: NORMAL
CHLORIDE BLD-SCNC: 96 MMOL/L (ref 98–107)
CK SERPL-CCNC: 216 U/L (ref 30–190)
CO2 SERPL-SCNC: 18 MMOL/L (ref 22–31)
COCAINE UR QL: NORMAL
COLOR UR AUTO: YELLOW
CREAT SERPL-MCNC: 0.87 MG/DL (ref 0.7–1.3)
CREAT UR-MCNC: 203 MG/DL
DIASTOLIC BLOOD PRESSURE - MUSE: 63 MMHG
EOSINOPHIL # BLD AUTO: 0 10E3/UL (ref 0–0.7)
EOSINOPHIL NFR BLD AUTO: 0 %
ERYTHROCYTE [DISTWIDTH] IN BLOOD BY AUTOMATED COUNT: 12.8 % (ref 10–15)
ETHANOL SERPL-MCNC: <10 MG/DL
GFR SERPL CREATININE-BSD FRML MDRD: >90 ML/MIN/1.73M2
GLUCOSE BLD-MCNC: 107 MG/DL (ref 70–125)
GLUCOSE UR STRIP-MCNC: NEGATIVE MG/DL
HCO3 BLDV-SCNC: 20 MMOL/L (ref 24–30)
HCT VFR BLD AUTO: 47.1 % (ref 40–53)
HGB BLD-MCNC: 16.3 G/DL (ref 13.3–17.7)
HGB UR QL STRIP: NEGATIVE
HOLD SPECIMEN: NORMAL
HYALINE CASTS: 5 /LPF
IMM GRANULOCYTES # BLD: 0 10E3/UL
IMM GRANULOCYTES NFR BLD: 0 %
INR PPP: 1.08 (ref 0.85–1.15)
INTERPRETATION ECG - MUSE: NORMAL
KETONES BLD-SCNC: 6.7 MMOL/L
KETONES UR STRIP-MCNC: 150 MG/DL
LEUKOCYTE ESTERASE UR QL STRIP: NEGATIVE
LIPASE SERPL-CCNC: 28 U/L (ref 0–52)
LYMPHOCYTES # BLD AUTO: 1.3 10E3/UL (ref 0.8–5.3)
LYMPHOCYTES NFR BLD AUTO: 14 %
MAGNESIUM SERPL-MCNC: 1.4 MG/DL (ref 1.8–2.6)
MCH RBC QN AUTO: 32.1 PG (ref 26.5–33)
MCHC RBC AUTO-ENTMCNC: 34.6 G/DL (ref 31.5–36.5)
MCV RBC AUTO: 93 FL (ref 78–100)
METHADONE UR QL SCN: NORMAL
MONOCYTES # BLD AUTO: 0.6 10E3/UL (ref 0–1.3)
MONOCYTES NFR BLD AUTO: 7 %
MUCOUS THREADS #/AREA URNS LPF: PRESENT /LPF
NEUTROPHILS # BLD AUTO: 7.4 10E3/UL (ref 1.6–8.3)
NEUTROPHILS NFR BLD AUTO: 79 %
NITRATE UR QL: NEGATIVE
NRBC # BLD AUTO: 0 10E3/UL
NRBC BLD AUTO-RTO: 0 /100
OPIATES UR QL SCN: NORMAL
OXYCODONE UR QL: NORMAL
OXYHGB MFR BLDV: 64.3 % (ref 70–75)
P AXIS - MUSE: 76 DEGREES
PCO2 BLDV: 33 MM HG (ref 35–50)
PCP UR QL SCN: NORMAL
PH BLDV: 7.4 [PH] (ref 7.35–7.45)
PH UR STRIP: 7 [PH] (ref 5–7)
PHOSPHATE SERPL-MCNC: 2.6 MG/DL (ref 2.5–4.5)
PHOSPHATE SERPL-MCNC: 3.1 MG/DL (ref 2.5–4.5)
PLATELET # BLD AUTO: 202 10E3/UL (ref 150–450)
PO2 BLDV: 36 MM HG (ref 25–47)
POTASSIUM BLD-SCNC: 4.8 MMOL/L (ref 3.5–5)
PR INTERVAL - MUSE: 118 MS
PROT SERPL-MCNC: 9 G/DL (ref 6–8)
QRS DURATION - MUSE: 80 MS
QT - MUSE: 348 MS
QTC - MUSE: 494 MS
R AXIS - MUSE: 70 DEGREES
RBC # BLD AUTO: 5.07 10E6/UL (ref 4.4–5.9)
RBC URINE: 2 /HPF
SAO2 % BLDV: 65.2 % (ref 70–75)
SODIUM SERPL-SCNC: 148 MMOL/L (ref 136–145)
SP GR UR STRIP: 1.02 (ref 1–1.03)
SYSTOLIC BLOOD PRESSURE - MUSE: 110 MMHG
T AXIS - MUSE: 81 DEGREES
UROBILINOGEN UR STRIP-MCNC: 3 MG/DL
VENTRICULAR RATE- MUSE: 121 BPM
WBC # BLD AUTO: 9.4 10E3/UL (ref 4–11)
WBC URINE: 3 /HPF

## 2023-05-17 PROCEDURE — 99223 1ST HOSP IP/OBS HIGH 75: CPT | Performed by: INTERNAL MEDICINE

## 2023-05-17 PROCEDURE — 96365 THER/PROPH/DIAG IV INF INIT: CPT

## 2023-05-17 PROCEDURE — 36415 COLL VENOUS BLD VENIPUNCTURE: CPT | Performed by: INTERNAL MEDICINE

## 2023-05-17 PROCEDURE — 82010 KETONE BODYS QUAN: CPT | Performed by: EMERGENCY MEDICINE

## 2023-05-17 PROCEDURE — 99285 EMERGENCY DEPT VISIT HI MDM: CPT | Mod: 25

## 2023-05-17 PROCEDURE — 258N000003 HC RX IP 258 OP 636: Performed by: EMERGENCY MEDICINE

## 2023-05-17 PROCEDURE — 82805 BLOOD GASES W/O2 SATURATION: CPT | Performed by: EMERGENCY MEDICINE

## 2023-05-17 PROCEDURE — 80307 DRUG TEST PRSMV CHEM ANLYZR: CPT | Performed by: EMERGENCY MEDICINE

## 2023-05-17 PROCEDURE — 83690 ASSAY OF LIPASE: CPT | Performed by: EMERGENCY MEDICINE

## 2023-05-17 PROCEDURE — 250N000009 HC RX 250: Performed by: EMERGENCY MEDICINE

## 2023-05-17 PROCEDURE — 80053 COMPREHEN METABOLIC PANEL: CPT | Performed by: EMERGENCY MEDICINE

## 2023-05-17 PROCEDURE — 85025 COMPLETE CBC W/AUTO DIFF WBC: CPT | Performed by: EMERGENCY MEDICINE

## 2023-05-17 PROCEDURE — 83735 ASSAY OF MAGNESIUM: CPT | Performed by: EMERGENCY MEDICINE

## 2023-05-17 PROCEDURE — 93005 ELECTROCARDIOGRAM TRACING: CPT | Performed by: EMERGENCY MEDICINE

## 2023-05-17 PROCEDURE — 96366 THER/PROPH/DIAG IV INF ADDON: CPT

## 2023-05-17 PROCEDURE — 250N000013 HC RX MED GY IP 250 OP 250 PS 637: Performed by: INTERNAL MEDICINE

## 2023-05-17 PROCEDURE — 84100 ASSAY OF PHOSPHORUS: CPT | Performed by: INTERNAL MEDICINE

## 2023-05-17 PROCEDURE — 84100 ASSAY OF PHOSPHORUS: CPT | Performed by: EMERGENCY MEDICINE

## 2023-05-17 PROCEDURE — 250N000011 HC RX IP 250 OP 636: Performed by: EMERGENCY MEDICINE

## 2023-05-17 PROCEDURE — 85610 PROTHROMBIN TIME: CPT | Performed by: INTERNAL MEDICINE

## 2023-05-17 PROCEDURE — 120N000001 HC R&B MED SURG/OB

## 2023-05-17 PROCEDURE — 36415 COLL VENOUS BLD VENIPUNCTURE: CPT | Performed by: EMERGENCY MEDICINE

## 2023-05-17 PROCEDURE — 96375 TX/PRO/DX INJ NEW DRUG ADDON: CPT

## 2023-05-17 PROCEDURE — 82077 ASSAY SPEC XCP UR&BREATH IA: CPT | Performed by: EMERGENCY MEDICINE

## 2023-05-17 PROCEDURE — 96361 HYDRATE IV INFUSION ADD-ON: CPT

## 2023-05-17 PROCEDURE — 82550 ASSAY OF CK (CPK): CPT | Performed by: EMERGENCY MEDICINE

## 2023-05-17 PROCEDURE — 96368 THER/DIAG CONCURRENT INF: CPT

## 2023-05-17 PROCEDURE — 81001 URINALYSIS AUTO W/SCOPE: CPT | Mod: XU | Performed by: EMERGENCY MEDICINE

## 2023-05-17 RX ORDER — ONDANSETRON 2 MG/ML
4 INJECTION INTRAMUSCULAR; INTRAVENOUS EVERY 6 HOURS PRN
Status: DISCONTINUED | OUTPATIENT
Start: 2023-05-17 | End: 2023-05-19 | Stop reason: HOSPADM

## 2023-05-17 RX ORDER — SODIUM CHLORIDE 9 MG/ML
INJECTION, SOLUTION INTRAVENOUS CONTINUOUS
Status: DISCONTINUED | OUTPATIENT
Start: 2023-05-17 | End: 2023-05-18

## 2023-05-17 RX ORDER — LIDOCAINE 40 MG/G
CREAM TOPICAL
Status: DISCONTINUED | OUTPATIENT
Start: 2023-05-17 | End: 2023-05-19 | Stop reason: HOSPADM

## 2023-05-17 RX ORDER — ONDANSETRON 4 MG/1
4 TABLET, ORALLY DISINTEGRATING ORAL EVERY 6 HOURS PRN
Status: DISCONTINUED | OUTPATIENT
Start: 2023-05-17 | End: 2023-05-19 | Stop reason: HOSPADM

## 2023-05-17 RX ORDER — GABAPENTIN 300 MG/1
600 CAPSULE ORAL AT BEDTIME
COMMUNITY
Start: 2023-03-31

## 2023-05-17 RX ORDER — DIAZEPAM 5 MG
10 TABLET ORAL EVERY 30 MIN PRN
Status: DISCONTINUED | OUTPATIENT
Start: 2023-05-17 | End: 2023-05-19 | Stop reason: HOSPADM

## 2023-05-17 RX ORDER — DIAZEPAM 10 MG/2ML
5-10 INJECTION, SOLUTION INTRAMUSCULAR; INTRAVENOUS EVERY 30 MIN PRN
Status: DISCONTINUED | OUTPATIENT
Start: 2023-05-17 | End: 2023-05-19 | Stop reason: HOSPADM

## 2023-05-17 RX ORDER — ONDANSETRON 2 MG/ML
4 INJECTION INTRAMUSCULAR; INTRAVENOUS ONCE
Status: COMPLETED | OUTPATIENT
Start: 2023-05-17 | End: 2023-05-17

## 2023-05-17 RX ORDER — LORAZEPAM 2 MG/ML
2 INJECTION INTRAMUSCULAR EVERY 6 HOURS PRN
Status: DISCONTINUED | OUTPATIENT
Start: 2023-05-17 | End: 2023-05-19 | Stop reason: HOSPADM

## 2023-05-17 RX ORDER — HALOPERIDOL 5 MG/ML
2.5-5 INJECTION INTRAMUSCULAR EVERY 6 HOURS PRN
Status: DISCONTINUED | OUTPATIENT
Start: 2023-05-17 | End: 2023-05-19 | Stop reason: HOSPADM

## 2023-05-17 RX ORDER — GABAPENTIN 300 MG/1
300 CAPSULE ORAL EVERY 8 HOURS
Status: DISCONTINUED | OUTPATIENT
Start: 2023-05-22 | End: 2023-05-19 | Stop reason: HOSPADM

## 2023-05-17 RX ORDER — GABAPENTIN 300 MG/1
900 CAPSULE ORAL EVERY 8 HOURS
Status: DISCONTINUED | OUTPATIENT
Start: 2023-05-17 | End: 2023-05-19 | Stop reason: HOSPADM

## 2023-05-17 RX ORDER — GABAPENTIN 600 MG/1
1200 TABLET ORAL ONCE
Status: COMPLETED | OUTPATIENT
Start: 2023-05-17 | End: 2023-05-17

## 2023-05-17 RX ORDER — ACETAMINOPHEN 325 MG/1
650 TABLET ORAL EVERY 6 HOURS PRN
Status: DISCONTINUED | OUTPATIENT
Start: 2023-05-17 | End: 2023-05-19 | Stop reason: HOSPADM

## 2023-05-17 RX ORDER — CLONIDINE HYDROCHLORIDE 0.1 MG/1
0.1 TABLET ORAL EVERY 8 HOURS
Status: DISCONTINUED | OUTPATIENT
Start: 2023-05-17 | End: 2023-05-19 | Stop reason: HOSPADM

## 2023-05-17 RX ORDER — FLUMAZENIL 0.1 MG/ML
0.2 INJECTION, SOLUTION INTRAVENOUS
Status: DISCONTINUED | OUTPATIENT
Start: 2023-05-17 | End: 2023-05-19 | Stop reason: HOSPADM

## 2023-05-17 RX ORDER — MULTIPLE VITAMINS W/ MINERALS TAB 9MG-400MCG
1 TAB ORAL DAILY
Status: DISCONTINUED | OUTPATIENT
Start: 2023-05-18 | End: 2023-05-19 | Stop reason: HOSPADM

## 2023-05-17 RX ORDER — GABAPENTIN 100 MG/1
100 CAPSULE ORAL EVERY 8 HOURS
Status: DISCONTINUED | OUTPATIENT
Start: 2023-05-24 | End: 2023-05-19 | Stop reason: HOSPADM

## 2023-05-17 RX ORDER — FOLIC ACID 1 MG/1
1 TABLET ORAL DAILY
Status: DISCONTINUED | OUTPATIENT
Start: 2023-05-18 | End: 2023-05-19 | Stop reason: HOSPADM

## 2023-05-17 RX ORDER — MAGNESIUM SULFATE HEPTAHYDRATE 40 MG/ML
2 INJECTION, SOLUTION INTRAVENOUS ONCE
Status: COMPLETED | OUTPATIENT
Start: 2023-05-17 | End: 2023-05-17

## 2023-05-17 RX ORDER — GABAPENTIN 300 MG/1
600 CAPSULE ORAL EVERY 8 HOURS
Status: DISCONTINUED | OUTPATIENT
Start: 2023-05-20 | End: 2023-05-19 | Stop reason: HOSPADM

## 2023-05-17 RX ORDER — LORAZEPAM 2 MG/ML
1 INJECTION INTRAMUSCULAR ONCE
Status: COMPLETED | OUTPATIENT
Start: 2023-05-17 | End: 2023-05-17

## 2023-05-17 RX ORDER — ACETAMINOPHEN 650 MG/1
650 SUPPOSITORY RECTAL EVERY 6 HOURS PRN
Status: DISCONTINUED | OUTPATIENT
Start: 2023-05-17 | End: 2023-05-19 | Stop reason: HOSPADM

## 2023-05-17 RX ADMIN — ONDANSETRON 4 MG: 2 INJECTION INTRAMUSCULAR; INTRAVENOUS at 12:02

## 2023-05-17 RX ADMIN — CLONIDINE HYDROCHLORIDE 0.1 MG: 0.1 TABLET ORAL at 21:39

## 2023-05-17 RX ADMIN — CLONIDINE HYDROCHLORIDE 0.1 MG: 0.1 TABLET ORAL at 14:59

## 2023-05-17 RX ADMIN — DEXTROSE AND SODIUM CHLORIDE: 5; 450 INJECTION, SOLUTION INTRAVENOUS at 10:46

## 2023-05-17 RX ADMIN — GABAPENTIN 900 MG: 300 CAPSULE ORAL at 19:55

## 2023-05-17 RX ADMIN — FOLIC ACID: 5 INJECTION, SOLUTION INTRAMUSCULAR; INTRAVENOUS; SUBCUTANEOUS at 09:44

## 2023-05-17 RX ADMIN — GABAPENTIN 1200 MG: 600 TABLET, FILM COATED ORAL at 14:59

## 2023-05-17 RX ADMIN — SODIUM CHLORIDE 1000 ML: 9 INJECTION, SOLUTION INTRAVENOUS at 09:04

## 2023-05-17 RX ADMIN — MAGNESIUM SULFATE HEPTAHYDRATE 2 G: 40 INJECTION, SOLUTION INTRAVENOUS at 09:59

## 2023-05-17 RX ADMIN — LORAZEPAM 1 MG: 2 INJECTION INTRAMUSCULAR; INTRAVENOUS at 09:11

## 2023-05-17 ASSESSMENT — ACTIVITIES OF DAILY LIVING (ADL)
ADLS_ACUITY_SCORE: 39
ADLS_ACUITY_SCORE: 37
ADLS_ACUITY_SCORE: 35
ADLS_ACUITY_SCORE: 33
ADLS_ACUITY_SCORE: 39
ADLS_ACUITY_SCORE: 35
ADLS_ACUITY_SCORE: 39
ADLS_ACUITY_SCORE: 35

## 2023-05-17 ASSESSMENT — ENCOUNTER SYMPTOMS
TREMORS: 1
MYALGIAS: 1
VOMITING: 1
DIARRHEA: 0
NAUSEA: 1

## 2023-05-17 NOTE — ED NOTES
Pt states he took a sip of water that they had in the room and is feeling nauseas. CIWA 5. No Valium indicated. Provider updated. Zofran ordered.

## 2023-05-17 NOTE — PROGRESS NOTES
Pleasant A&Ox4. Denies pain. Denies nausea. Reports appetite -tolerated clear liquids well and diet advanced. Reports some spinning/tactile disturbance while trying to fall asleep but resolved when sitting upright awake. CIWA score 2. Urine sample obtained. Wife at bedside.

## 2023-05-17 NOTE — PHARMACY-ADMISSION MEDICATION HISTORY
Pharmacist Admission Medication History    Admission medication history is complete. The information provided in this note is only as accurate as the sources available at the time of the update.    Medication reconciliation/reorder completed by provider prior to medication history? No    Information Source(s): Patient and CareEverywhere/SureScripts via in-person    Pertinent Information:     Changes made to PTA medication list:    Added: Gabapentin     Deleted: None    Changed: None    Medication Affordability:       Allergies reviewed with patient and updates made in EHR: yes    Medication History Completed By: Ena Abreu PharmD 5/17/2023 10:05 AM    Prior to Admission medications    Medication Sig Last Dose Taking? Auth Provider Long Term End Date   multivitamin w/minerals (MULTI-VITAMIN) tablet Take 1 tablet by mouth daily Past Week at am Yes Unknown, Entered By History     ondansetron (ZOFRAN ODT) 4 MG ODT tab Take 1 tablet (4 mg) by mouth every 6 hours as needed for nausea or vomiting Unknown at prn Yes Leonardo Weinstein MD     gabapentin (NEURONTIN) 300 MG capsule Take 300 mg by mouth At Bedtime 5/15/2023  Unknown, Entered By History

## 2023-05-17 NOTE — ED TRIAGE NOTES
The patient presents to the ED with alcohol withdrawal and emesis. He reports his last drink was Saturday. He reports drinking a 1.75 of vodka over a couple of days. The patient is hyperventilating in triage. Reports all over body pain. History of seizure from withdrawal one time in the past.

## 2023-05-17 NOTE — ED PROVIDER NOTES
EMERGENCY DEPARTMENT ENCOUNTER      NAME: Aakash Mcclelland  AGE: 33 year old male  YOB: 1990  MRN: [id number]  EVALUATION DATE & TIME: No admission date for patient encounter.    PCP: Ana Sanchez    ED PROVIDER: Zarina Ariza MD      Chief Complaint   Patient presents with     Withdrawal         FINAL IMPRESSION:  1. Alcohol withdrawal syndrome with complication (H)          ED COURSE & MEDICAL DECISION MAKING:    Pertinent Labs & Imaging studies reviewed. (See chart for details)    9:01 AM I introduced myself to the patient, obtained patient history, performed a physical exam, and discussed plan for ED workup including potential diagnostic laboratory/imaging studies and interventions.  11:14 AM I discussed the case with hospitalist, Dr Gonzalez, who accepts the patient      33 year old male with history of alcohol dependence and alcohol withdrawal seizure who presents to the Emergency Department for evaluation of alcohol withdrawal.  States that he drank 1.75 [place] bottle of vodka over 4 days over this weekend and had been drinking last week daily as well.  States he has not had a drink since Saturday and started having withdrawal symptoms on Sunday.  Tried to manage this at home but then presents here as he has worsening withdrawal and prior history of a seizure.  States that the seizure occurred many years ago.  Was hospitalized here in March for alcohol withdrawal and detox as well and went through a treatment program but unfortunately started drinking again a couple weeks ago per wife.  He has been vomiting since Sunday and not really been able to keep anything down since then.  On exam here, he is hyperventilating and has carpopedal spasm bilaterally.  He is tremulous and tachycardic as well as hypertensive.  He is afebrile and satting normally on room air.  Other than the hyperventilation no sign of respiratory distress and lung sounds are clear.  States that his symptoms are typical  of his withdrawal.  Has generalized body cramping and aches.  IV access was established.  He was given 1 mg of IV Ativan as he was in clear alcohol withdrawal.  Also started 1 L bolus of normal saline.  Laboratory studies obtained.  Did have the patient breathe into an emesis bag to try to reduce his respiratory rate and hyperventilation.  Did send off a VBG as well with the hyperventilation.  VBG reveals pH is 7.4 with a PCO2 of 33 bicarb of 20.  This is consistent with his hyperventilation.  On reevaluation breathing is much improved and his carpal spasm has resolved after the Ativan.  Magnesium slightly low 1.4 so will replace with 2 g of magnesium sulfate IV as well.  CMP reveals a sodium of 148 as well as a chloride of 96 bicarb of 18.  Does have an ion gap of 34.  Did discuss with the patient about any potential toxic alcohols such as methanol or ethylene glycol and he denies this.  States he works at [employer] and gets the [place] 1.75 vodka easily so has not been drinking anything else.  Do suspect that he likely has alcoholic/starvation ketosis with his significant withdrawal symptoms and repetitive vomiting.  Did add on a CK.  CBC reveals white blood count of 9.4 with hemoglobin of 16.3.  With the normal pH and normal white blood cell count and him being afebrile without infectious symptoms do feel that sepsis would be very unlikely.  Do suspect that his lactic acid if ordered would be somewhat elevated with his withdrawal and alcohol use and again with a normal white count no fever and normal pH did not feel this was necessary at this time.  This may be part of his ion gap as well.  Did give the patient IV banana bag with Infuvite, thiamine, and folate acid as well as 1 L of normal saline.  Thus he will have 2 L of normal saline.  Did then order 200 mL an hour of D5 half-normal saline to continue after this to improve his starvation alcoholic ketosis.  Elevated sodium likely related to the alcohol use and  significant dehydration.  AST and ALT are 204 and 158 respectively.  Bili slightly elevated at 1.9 alk phos within normal limits.  Does not have any significant abdominal tenderness on my exam.  Do feel this is likely alcohol related.  Lipase within normal limits making pancreatitis unlikely.  Phosphorus within normal limits.  Alcohol level less than 10.  Ketones are pending at this time.  Denies any illicit drug use.  Did obtain EKG with his report of chest pain and the tachycardia this reveals sinus tachycardia at 121 bpm.  No obvious sign of acute ischemia.  Do feel this is likely related to his alcohol withdrawal and have low suspicion for acute coronary syndrome as he has no risk factors for this otherwise.    He was placed on CIWA protocol and will continue to get Valium per protocol.  Vital signs have significantly improved after the IV Ativan.  Heart rate now in the 80s with normal blood pressure here.  Also significantly improved respiratory rate and maintains normal oxygen saturations.  He is much more comfortable appearing on my reevaluation.    Ketones elevated 6.7 which I did expect.  Starting D5 half-normal saline at 125 an hour after completing the banana bag and liter of normal saline.  Banana bag also has a liter of normal saline.  CK slightly elevated to 16 but not significantly.  Thus do not suspect significant rhabdomyolysis.  Was given some Zofran for nausea.  No vomiting here.  Feels much improved.  Repeat CIWA 5 after IV Ativan.  Vital signs remained improved.  Spoke with the hospitalist for admission for alcoholic/starvation ketosis and alcohol withdrawal.  Seizure pads are in place and he is high risk for seizures so will need close CIWA monitoring.  They accepted the patient for admission.  Patient and his wife are in agreement with this plan.  He was admitted in stable condition.         At the conclusion of the encounter I discussed the results of all of the tests and the disposition. The  questions were answered. The patient or family acknowledged understanding and was agreeable with the care plan.         Medical Decision Making    History:    Supplemental history from: Documented in chart, if applicable and Family Member/Significant Other    External Record(s) reviewed: Documented in chart, if applicable.    Work Up:    Chart documentation includes differential considered and any EKGs or imaging independently interpreted by provider.    In additional to work up documented, I considered the following work up: Documented in chart, if applicable.    External consultation:    Discussion of management with another provider: Documented in chart, if applicable and Hospitalist    Complicating factors:    Care impacted by chronic illness: Other: alcohol abuse    Care affected by social determinants of health: Alcohol Abuse and/or Recreational Drug Use    Disposition considerations: Admit.      MEDICATIONS GIVEN IN THE EMERGENCY:  Medications   sodium chloride 0.9 % 1,000 mL with Infuvite Adult 10 mL, thiamine 100 mg, folic acid 1 mg infusion ( Intravenous $New Bag 5/17/23 0933)   diazepam (VALIUM) tablet 10 mg (has no administration in time range)     Or   diazepam (VALIUM) injection 5-10 mg (has no administration in time range)   magnesium sulfate 2 g in 50 mL sterile water intermittent infusion (2 g Intravenous $New Bag 5/17/23 0956)   dextrose 5% and 0.45% NaCl infusion (has no administration in time range)   LORazepam (ATIVAN) injection 1 mg (1 mg Intravenous $Given 5/17/23 0911)   0.9% sodium chloride BOLUS (1,000 mLs Intravenous $New Bag 5/17/23 0904)       NEW PRESCRIPTIONS STARTED AT TODAY'S ER VISIT  New Prescriptions    No medications on file          =================================================================    HPI    Patient information was obtained from: Patient and patient's wife    Use of : N/A         Aakash Mcclleland is a 33 year old male with a pertinent history of  alcoholism and alcohol withdrawal seizures who presents to this ED for evaluation of alcohol withdrawal.    Per patient's wife, patient relapsed a few weeks ago and has been drinking steadily since.  He was admitted in March of this year here at [facility] for alcohol withdrawal which I did review.  Similar presentation at that time.  Then went to a rehab facility but unfortunately relapsed.    Patient reports alcohol withdrawal symptoms since Sunday including nausea, vomiting, and body aches after drinking 1.75 L of vodka over four days. He notes that his last drink was on Saturday.  He reports he has body cramping everywhere including his chest and abdomen and is feeling short of breath with hyperventilation which is typical of his alcohol withdrawal symptoms. Patient denies diarrhea, hematemesis, melena, hematochezia, fever, cough or additional symptoms or complaints at this time.  He denies any illicit drug use.  He denies drinking any toxic alcohols.  States that he works at [employer] and easily gets access to the large bottles of [place] vodka.  He does have history of alcohol withdrawal seizures and states the last 1 was multiple years ago.      REVIEW OF SYSTEMS   Review of Systems   Gastrointestinal: Positive for nausea and vomiting. Negative for diarrhea.   Musculoskeletal: Positive for myalgias (generalized body aches).   Neurological: Positive for tremors (uncontrollable shaking).   All other systems reviewed and are negative.     Pertinent positives and negatives are documented in the HPI. All other systems reviewed and are negative.      PAST MEDICAL HISTORY:  History reviewed. No pertinent past medical history.    PAST SURGICAL HISTORY:  Past Surgical History:   Procedure Laterality Date     IR MISCELLANEOUS PROCEDURE  7/27/2009     IR MISCELLANEOUS PROCEDURE  2/26/2012           CURRENT MEDICATIONS:    multivitamin w/minerals (MULTI-VITAMIN) tablet  ondansetron (ZOFRAN ODT) 4 MG ODT tab  gabapentin  "(NEURONTIN) 300 MG capsule        ALLERGIES:  Allergies   Allergen Reactions     Cephalosporins Rash       FAMILY HISTORY:  History reviewed. No pertinent family history.    SOCIAL HISTORY:   Social History     Socioeconomic History     Marital status:    Tobacco Use     Smoking status: Never     Smokeless tobacco: Never       VITALS:  /66   Pulse 85   Temp 98.4  F (36.9  C) (Oral)   Resp 27   Ht 1.727 m (5' 8\")   Wt 65.8 kg (145 lb)   SpO2 97%   BMI 22.05 kg/m      PHYSICAL EXAM    Physical Exam  Constitutional: Appears uncomfortable with carpopedal spasm and hyperventilation.  HENT: Normocephalic, Atraumatic, Bilateral external ears normal, Oropharynx normal, mucous membranes dry, Nose normal. Neck- Normal range of motion, No tenderness, Supple, No stridor.   Eyes: PERRL, EOMI, Conjunctiva normal, No discharge.   Respiratory: Normal breath sounds, No respiratory distress, No wheezing or crackles, Speaks in full sentences easily.  Tachypneic and hyperventilating no accessory muscle use.  Satting normally on room air.  Cardiovascular: Tachycardic, Regular rhythm, No murmurs, No rubs, No gallops. 2+ radial pulses bilaterally  GI: Bowel sounds normal, Soft, No tenderness, No masses, No rebound or guarding.  Musculoskeletal: 2+ DP pulses. No notable lower extremity edema.  Carpopedal spasm.  Once this was improved normal range of motion of all 4 extremities in all joints.  Integument: Warm, Dry, No erythema, No rash. No petechiae.  Neurologic: Alert & oriented x 3, no pedal spasm.  No focal deficits noted.  Tremulous.  Psychiatric: Anxious    LAB:  All pertinent labs reviewed and interpreted.  Results for orders placed or performed during the hospital encounter of 05/17/23   Comprehensive metabolic panel   Result Value Ref Range    Sodium 148 (H) 136 - 145 mmol/L    Potassium 4.8 3.5 - 5.0 mmol/L    Chloride 96 (L) 98 - 107 mmol/L    Carbon Dioxide (CO2) 18 (L) 22 - 31 mmol/L    Anion Gap 34 (H) 5 - " 18 mmol/L    Urea Nitrogen 6 (L) 8 - 22 mg/dL    Creatinine 0.87 0.70 - 1.30 mg/dL    Calcium 10.8 (H) 8.5 - 10.5 mg/dL    Glucose 107 70 - 125 mg/dL    Alkaline Phosphatase 111 45 - 120 U/L     (H) 0 - 40 U/L     (H) 0 - 45 U/L    Protein Total 9.0 (H) 6.0 - 8.0 g/dL    Albumin 5.3 (H) 3.5 - 5.0 g/dL    Bilirubin Total 1.9 (H) 0.0 - 1.0 mg/dL    GFR Estimate >90 >60 mL/min/1.73m2   Result Value Ref Range    Magnesium 1.4 (L) 1.8 - 2.6 mg/dL   Ethyl Alcohol Level   Result Value Ref Range    Alcohol, Blood <10 None detected mg/dL   Result Value Ref Range    Lipase 28 0 - 52 U/L   Extra Red Top Tube   Result Value Ref Range    Hold Specimen JIC    Extra Green Top (Lithium Heparin) Tube   Result Value Ref Range    Hold Specimen JIC    Extra Purple Top Tube   Result Value Ref Range    Hold Specimen JIC    Extra Heparinized Syringe   Result Value Ref Range    Hold Specimen JIC    Extra Green Top (Lithium Heparin) ON ICE   Result Value Ref Range    Hold Specimen JIC    CBC with platelets and differential   Result Value Ref Range    WBC Count 9.4 4.0 - 11.0 10e3/uL    RBC Count 5.07 4.40 - 5.90 10e6/uL    Hemoglobin 16.3 13.3 - 17.7 g/dL    Hematocrit 47.1 40.0 - 53.0 %    MCV 93 78 - 100 fL    MCH 32.1 26.5 - 33.0 pg    MCHC 34.6 31.5 - 36.5 g/dL    RDW 12.8 10.0 - 15.0 %    Platelet Count 202 150 - 450 10e3/uL    % Neutrophils 79 %    % Lymphocytes 14 %    % Monocytes 7 %    % Eosinophils 0 %    % Basophils 0 %    % Immature Granulocytes 0 %    NRBCs per 100 WBC 0 <1 /100    Absolute Neutrophils 7.4 1.6 - 8.3 10e3/uL    Absolute Lymphocytes 1.3 0.8 - 5.3 10e3/uL    Absolute Monocytes 0.6 0.0 - 1.3 10e3/uL    Absolute Eosinophils 0.0 0.0 - 0.7 10e3/uL    Absolute Basophils 0.0 0.0 - 0.2 10e3/uL    Absolute Immature Granulocytes 0.0 <=0.4 10e3/uL    Absolute NRBCs 0.0 10e3/uL   Blood gas venous   Result Value Ref Range    pH Venous 7.40 7.35 - 7.45    pCO2 Venous 33 (L) 35 - 50 mm Hg    pO2 Venous 36 25 - 47  mm Hg    Bicarbonate Venous 20 (L) 24 - 30 mmol/L    Base Excess/Deficit (+/-) -4.8   mmol/L    Oxyhemoglobin Venous 64.3 (L) 70.0 - 75.0 %    O2 Sat, Venous 65.2 (L) 70.0 - 75.0 %   Result Value Ref Range    Phosphorus 3.1 2.5 - 4.5 mg/dL       RADIOLOGY:  Reviewed all pertinent imaging. Please see official radiology report.  No orders to display       EKG:    Performed at: 9:15    Impression: Sinus tachycardia.  No evidence of acute ischemia.    Rate: 121  Rhythm: Sinus  WV Interval: 118  QRS Interval: 80  QTc Interval: 494  ST changes: None    Comparison: When compared with EKG of 3/20/23, no significant changes were found    I have independently reviewed and interpreted the EKG(s) documented above.    PROCEDURES:   None      [employer] System Documentation:   CMS Diagnoses:               I, Pepe Abby, am serving as a scribe to document services personally performed by Zarina Ariza MD based on my observation and the provider's statements to me. I, Zarina Ariza MD, attest that Pepe Mora is acting in a scribe capacity, has observed my performance of the services and has documented them in accordance with my direction.    Zarina Ariza MD  [facility] EMERGENCY ROOM  [address] [address] [phone]     Zarina Ariza MD  05/17/23 8224

## 2023-05-17 NOTE — H&P
[facility]    History and Physical - Hospitalist Service       Date of Admission:  5/17/2023    Assessment & Plan      Aakash Mcclelland is a 33 year old male admitted on 5/17/2023. He has a history of alcohol dependence, alcohol hepatitis, fatty liver disease, and alcohol withdrawal seizures and is admitted for alcohol withdrawal and alcoholic ketosis.    On arrival to the ED patient hyperventilating, tachycardic 120s, hypertensive, and afebrile. Laboratory evaluation reveals anion gap of 34, hypomagnesemia, hypernatremia, VBG with hypocarbia, elevated ketones, elevated CRP, and elevated transaminases AST>ALT. Started on CIWA protocol. Given ativan and valium with improvement of vital signs.     Alcohol withdrawal syndrome  Alcohol  abuse, dependence  History of alcohol withdrawal seizures   Alcoholic ketoacidosis  - Patient's last drink 5/13. Poor p.o. intake with vomiting since. Patient expressing desire to quit.   - Anion gap 34 and ketones 6.70 likely due to alcoholic ketoacidosis   - Continue CIWA protocol   - Continue D5 1/2  ml/hr, s/p 1L NS  - Continue banana bag   - Monitor for seizures, ativan prn. Seizure precautions in place.     Hypernatremia  - Likely due to dehydration  - Status post 1L NS  - Continue D5 1/2  ml/hr     Hypomagnesemia   - 1.4 on admission, replaced   - Continue to monitor     Alcoholic hepatitis  History of alcohol liver disease  - AST>ALT expected in setting alcohol use disorder  - Continue to monitor      Sinus tachycardia  Heart rate improved with fluids  Monitor.     Diet:  Advance as tolerated  DVT Prophylaxis: SCDs, ambulation  Hatch Catheter: Not present  Lines: None     Cardiac Monitoring: None  Code Status:  full     Disposition Plan      Expected Discharge Date: 05/19/2023                The patient's care was discussed with the Attending Physician, Dr. Gonzalez.      Yani Allison  Hospitalist Service  [facility]  [facility]  Securely message with [employer] (more info)  Text page via [facility] Paging/Directory   ______________________________________________________________________    Chief Complaint   Alcohol withdrawal     History is obtained from the patient    History of Present Illness   Aakash Mcclelland is a 33 year old male admitted on 5/17/2023. He has a history of alcohol use disorder and alcohol withdrawal seizures who is admitted for alcohol withdrawal and alcoholic ketosis. The patient was hospitalized in March this year for alcohol withdrawal complicated by seizure. Went to detox after the hospitalization. He was sober until 2 weeks ago, started drinking 1.75 L vodka every 4 days. Patient's last drink was on 5/13. Starting 5/14 the patient began to have nausea, vomiting, and muscle cramps \"everywhere\". He has been unable to tolerate PO intake since. Notes this feels similar to his past withdrawals. Denies recent fever. No sick contacts.     On arrival to the ED patient hyperventilating, tachycardic 120s, hypertensive, and afebrile. Laboratory evaluation reveals anion gap of 34, hypomagnesemia, hypernatremia, VBG with hypocarbia, elevated ketones, elevated CRP, and elevated transaminases AST>ALT. Started on CIWA protocol. Given ativan and valium with improvement of vital signs.       Past Medical History    History reviewed. No pertinent past medical history.    Past Surgical History   Past Surgical History:   Procedure Laterality Date     IR MISCELLANEOUS PROCEDURE  7/27/2009     IR MISCELLANEOUS PROCEDURE  2/26/2012       Prior to Admission Medications   Prior to Admission Medications   Prescriptions Last Dose Informant Patient Reported? Taking?   gabapentin (NEURONTIN) 300 MG capsule 5/15/2023  Yes No   Sig: Take 300 mg by mouth At Bedtime   multivitamin w/minerals (MULTI-VITAMIN) tablet Past Week at am  Yes Yes   Sig: Take 1 tablet by mouth daily   ondansetron (ZOFRAN ODT) 4 MG ODT tab Unknown at prn  No Yes "   Sig: Take 1 tablet (4 mg) by mouth every 6 hours as needed for nausea or vomiting      Facility-Administered Medications: None        Physical Exam   Vital Signs: Temp: 98.4  F (36.9  C) Temp src: Oral BP: 103/62 Pulse: 77   Resp: 25 SpO2: 97 % O2 Device: None (Room air)    Weight: 145 lbs 0 oz    Physical Exam  HENT:      Head: Normocephalic and atraumatic.      Mouth/Throat:      Mouth: Mucous membranes are dry.   Eyes:      Pupils: Pupils are equal, round, and reactive to light.   Cardiovascular:      Rate and Rhythm: Normal rate and regular rhythm.      Heart sounds: No murmur heard.     No gallop.   Pulmonary:      Effort: Pulmonary effort is normal. No respiratory distress.   Abdominal:      Palpations: Abdomen is soft.      Tenderness: There is no abdominal tenderness. There is no guarding or rebound.   Musculoskeletal:         General: No tenderness.      Right lower leg: No edema.      Left lower leg: No edema.   Skin:     General: Skin is dry.   Neurological:      Mental Status: He is alert. Mental status is at baseline.      Comments: Resting tremor       > 50  MINUTES SPENT BY ME on the date of service doing chart review, history, exam, documentation & further activities per the note.      Data     Most Recent 3 CBC's:Recent Labs   Lab Test 05/17/23  0858 03/21/23  0612 03/20/23  2258   WBC 9.4 6.5 5.5   HGB 16.3 13.2* 14.3   MCV 93 97 94    148* 155     Most Recent 3 BMP's:Recent Labs   Lab Test 05/17/23  0858 05/01/23  0854 03/22/23  0900   * 146* 139   POTASSIUM 4.8 3.9 3.6   CHLORIDE 96* 105 106   CO2 18* 24 25   BUN 6* 6.4 5*   CR 0.87 0.70 0.66*   ANIONGAP 34* 17* 8   VIRGINIA 10.8* 9.6 9.2    73 75     Most Recent 2 LFT's:Recent Labs   Lab Test 05/17/23  0858 05/01/23  0854   * 178*   * 282*   ALKPHOS 111 110   BILITOTAL 1.9* 0.3       I agree with the documentation and findings as written by Yani Allison   and our discussed and formulated assessment  and plan. I was present with her who participated in the service and documentation of the note. I have also personally verified the history and personally performed the physical exam and medical decision-making. I agree with the assessment and plan of care as documented in the note.    Cecy Gonzalez MD  Internal Medicine  Hospitalist  [facility]  Phone: #[phone]

## 2023-05-18 LAB
ALBUMIN SERPL-MCNC: 3.3 G/DL (ref 3.5–5)
ALP SERPL-CCNC: 75 U/L (ref 45–120)
ALT SERPL W P-5'-P-CCNC: 86 U/L (ref 0–45)
ANION GAP SERPL CALCULATED.3IONS-SCNC: 8 MMOL/L (ref 5–18)
AST SERPL W P-5'-P-CCNC: 105 U/L (ref 0–40)
BILIRUB SERPL-MCNC: 1.4 MG/DL (ref 0–1)
BUN SERPL-MCNC: 3 MG/DL (ref 8–22)
CALCIUM SERPL-MCNC: 8.2 MG/DL (ref 8.5–10.5)
CHLORIDE BLD-SCNC: 102 MMOL/L (ref 98–107)
CO2 SERPL-SCNC: 28 MMOL/L (ref 22–31)
CREAT SERPL-MCNC: 0.71 MG/DL (ref 0.7–1.3)
ERYTHROCYTE [DISTWIDTH] IN BLOOD BY AUTOMATED COUNT: 12.7 % (ref 10–15)
GFR SERPL CREATININE-BSD FRML MDRD: >90 ML/MIN/1.73M2
GLUCOSE BLD-MCNC: 87 MG/DL (ref 70–125)
HCT VFR BLD AUTO: 36.7 % (ref 40–53)
HGB BLD-MCNC: 12.6 G/DL (ref 13.3–17.7)
MCH RBC QN AUTO: 32.5 PG (ref 26.5–33)
MCHC RBC AUTO-ENTMCNC: 34.3 G/DL (ref 31.5–36.5)
MCV RBC AUTO: 95 FL (ref 78–100)
PLATELET # BLD AUTO: 124 10E3/UL (ref 150–450)
POTASSIUM BLD-SCNC: 3.7 MMOL/L (ref 3.5–5)
PROT SERPL-MCNC: 5.7 G/DL (ref 6–8)
RBC # BLD AUTO: 3.88 10E6/UL (ref 4.4–5.9)
SODIUM SERPL-SCNC: 138 MMOL/L (ref 136–145)
WBC # BLD AUTO: 5 10E3/UL (ref 4–11)

## 2023-05-18 PROCEDURE — 250N000013 HC RX MED GY IP 250 OP 250 PS 637: Performed by: INTERNAL MEDICINE

## 2023-05-18 PROCEDURE — 80053 COMPREHEN METABOLIC PANEL: CPT | Performed by: INTERNAL MEDICINE

## 2023-05-18 PROCEDURE — 36415 COLL VENOUS BLD VENIPUNCTURE: CPT | Performed by: INTERNAL MEDICINE

## 2023-05-18 PROCEDURE — 85027 COMPLETE CBC AUTOMATED: CPT | Performed by: INTERNAL MEDICINE

## 2023-05-18 PROCEDURE — G0378 HOSPITAL OBSERVATION PER HR: HCPCS

## 2023-05-18 PROCEDURE — 99233 SBSQ HOSP IP/OBS HIGH 50: CPT | Performed by: INTERNAL MEDICINE

## 2023-05-18 PROCEDURE — 250N000011 HC RX IP 250 OP 636: Performed by: EMERGENCY MEDICINE

## 2023-05-18 RX ADMIN — GABAPENTIN 900 MG: 300 CAPSULE ORAL at 20:23

## 2023-05-18 RX ADMIN — GABAPENTIN 900 MG: 300 CAPSULE ORAL at 11:52

## 2023-05-18 RX ADMIN — GABAPENTIN 900 MG: 300 CAPSULE ORAL at 04:41

## 2023-05-18 RX ADMIN — MULTIPLE VITAMINS W/ MINERALS TAB 1 TABLET: TAB at 07:41

## 2023-05-18 RX ADMIN — CLONIDINE HYDROCHLORIDE 0.1 MG: 0.1 TABLET ORAL at 21:36

## 2023-05-18 RX ADMIN — FOLIC ACID 1 MG: 1 TABLET ORAL at 07:41

## 2023-05-18 RX ADMIN — Medication 100 MG: at 07:41

## 2023-05-18 RX ADMIN — CLONIDINE HYDROCHLORIDE 0.1 MG: 0.1 TABLET ORAL at 13:45

## 2023-05-18 RX ADMIN — DEXTROSE AND SODIUM CHLORIDE: 5; 450 INJECTION, SOLUTION INTRAVENOUS at 03:08

## 2023-05-18 RX ADMIN — CLONIDINE HYDROCHLORIDE 0.1 MG: 0.1 TABLET ORAL at 04:41

## 2023-05-18 ASSESSMENT — ACTIVITIES OF DAILY LIVING (ADL)
ADLS_ACUITY_SCORE: 39
DEPENDENT_IADLS:: INDEPENDENT
ADLS_ACUITY_SCORE: 39

## 2023-05-18 NOTE — PROGRESS NOTES
Patient verbalized concern about having involuntary muscle twitching and jerking which wakes him up. Denies any pain. No nausea or vomiting.CIWA score is 3. VSS.neuro checks intact. Dr. Perry notified of patient's concern; no new orders received. Will continue with plan of care.

## 2023-05-18 NOTE — PROGRESS NOTES
"[facility] Medicine PROGRESS NOTE      Identification/Summary:   Aakash Mcclelland is a 33 year old male admitted on 5/17/2023. He has a history of alcohol dependence, alcohol hepatitis, fatty liver disease, and alcohol withdrawal seizures and is admitted for alcohol withdrawal and alcoholic ketoacidosis.  Started on CIWA protocol.  CIWA scores are low.  Social work to see he is interested in inpatient treatment.  Discharge in a.m.     Alcohol withdrawal syndrome  Alcohol  abuse, dependence  History of alcohol withdrawal seizures   Alcoholic ketoacidosis  - Patient's last drink 5/13. Poor p.o. intake with vomiting since. Patient expressing desire to quit.   - Anion gap 34 and ketones 6.70 likely due to alcoholic ketoacidosis   - Continue CIWA protocol   Status post IV fluids  Discontinue today    Hypernatremia  Improved with fluids     Hypomagnesemia   - 1.4 on admission, replaced   - Continue to monitor      Alcoholic hepatitis  History of alcohol liver disease  - AST>ALT expected in setting alcohol use disorder  - Continue to monitor    Interested in inpatient treatment  Social work following.    Sinus tachycardia  Heart rate improved with fluids  Monitor.       Diet: Advance Diet as Tolerated: Regular Diet Adult  DVT Prophylaxis: Ambulating  Code Status: Full Code    Anticipated possible discharge in 1 days once clinically stable  milestones are met.    Interval History/Subjective:  Doing better today.  Intermittent muscle twitching present.  Tremors are better.  No nausea vomiting abdominal pain, shortness of breath, chest pain    Physical Exam/Objective:  Vitals I/O   Vital signs:  Temp: 98  F (36.7  C) Temp src: Oral BP: 110/75 Pulse: 68   Resp: 18 SpO2: 99 % O2 Device: None (Room air)   Height: 172.7 cm (5' 8\") Weight: 65.8 kg (145 lb)  Estimated body mass index is 22.05 kg/m  as calculated from the following:    Height as of this encounter: 1.727 m (5' 8\").    Weight as of this encounter: 65.8 kg (145 " lb). I/O last 3 completed shifts:  In: 1958.33 [P.O.:800; I.V.:1108.33; IV Piggyback:50]  Out: 1025 [Urine:1025]     Body mass index is 22.05 kg/m .    General Appearance:  Alert, cooperative, no distress   Head:  Normocephalic, atraumatic   Eyes:  PERRL    Throat:  mucosa; moist   Neck: No JVD, thyromegaly   Lungs:   Clear to auscultation bilaterally, respirations unlabored   Chest Wall:  No tenderness or deformity   Heart:  Regular rate and rhythm, S1, S2 normal,no murmur   Abdomen:   Soft, non tender, non distended, bowel sounds present, no guarding or rigidity   Extremities: No edema, no joint swelling   Skin: Skin color, texture, turgor normal, no rashes or lesions   Neurologic: Alert and oriented X 3, Moves all 4 extremities no tremors       Medications:   Personally Reviewed.    cloNIDine  0.1 mg Oral Q8H     folic acid  1 mg Oral Daily     [START ON 5/24/2023] gabapentin  100 mg Oral Q8H     [START ON 5/22/2023] gabapentin  300 mg Oral Q8H     [START ON 5/20/2023] gabapentin  600 mg Oral Q8H     gabapentin  900 mg Oral Q8H     multivitamin w/minerals  1 tablet Oral Daily     sodium chloride (PF)  3 mL Intracatheter Q8H     thiamine  100 mg Oral Daily       Data reviewed today: I personally reviewed all new medications, labs, imaging/diagnostics reports over the past 24 hours. Pertinent findings include     Labs:  Most Recent 3 CBC's:Recent Labs   Lab Test 05/18/23  0756 05/17/23  0858 03/21/23  0612   WBC 5.0 9.4 6.5   HGB 12.6* 16.3 13.2*   MCV 95 93 97   * 202 148*     Most Recent 3 BMP's:Recent Labs   Lab Test 05/18/23  0756 05/17/23  0858 05/01/23  0854    148* 146*   POTASSIUM 3.7 4.8 3.9   CHLORIDE 102 96* 105   CO2 28 18* 24   BUN 3* 6* 6.4   CR 0.71 0.87 0.70   ANIONGAP 8 34* 17*   VIRGINIA 8.2* 10.8* 9.6   GLC 87 107 73     Most Recent 2 LFT's:Recent Labs   Lab Test 05/18/23  0756 05/17/23  0858   * 204*   ALT 86* 158*   ALKPHOS 75 111   BILITOTAL 1.4* 1.9*     Most Recent 3  INR's:Recent Labs   Lab Test 05/17/23  1356 03/22/23  0900   INR 1.08 1.04       Imaging:   No results found for this or any previous visit (from the past 24 hour(s)).     > 50 MINUTES SPENT BY ME on the date of service doing chart review, history, exam, documentation & further activities per the note.    Cecy Gonzalez MD  Hospitalist  [facility]

## 2023-05-18 NOTE — CONSULTS
Care Management Initial Consult    General Information  Assessment completed with: Patient,    Type of CM/SW Visit: Initial Assessment    Primary Care Provider verified and updated as needed: Yes   Readmission within the last 30 days:        Reason for Consult: substance use concerns  Advance Care Planning:            Communication Assessment  Patient's communication style: spoken language (English or Bilingual)             Cognitive  Cognitive/Neuro/Behavioral: WDL                      Living Environment:   People in home: spouse     Current living Arrangements: apartment      Able to return to prior arrangements: yes       Family/Social Support:  Care provided by: self  Provides care for:    Marital Status:   Wife          Description of Support System: Supportive, Involved         Current Resources:   Patient receiving home care services: No     Community Resources: None  Equipment currently used at home:    Supplies currently used at home: None    Employment/Financial:  Employment Status: employed full-time        Financial Concerns: No concerns identified   Referral to Financial Worker: No       Does the patient's insurance plan have a 3 day qualifying hospital stay waiver?      Lifestyle & Psychosocial Needs:  Social Determinants of Health     Tobacco Use: Low Risk  (5/17/2023)    Patient History      Smoking Tobacco Use: Never      Smokeless Tobacco Use: Never      Passive Exposure: Not on file   Alcohol Use: Not on file   Financial Resource Strain: Not on file   Food Insecurity: Not on file   Transportation Needs: Not on file   Physical Activity: Not on file   Stress: Not on file   Social Connections: Not on file   Intimate Partner Violence: Not on file   Depression: Not on file   Housing Stability: Not on file       Functional Status:  Prior to admission patient needed assistance:   Dependent ADLs:: Independent  Dependent IADLs:: Independent       Mental Health Status:        No   Chemical Dependency  "Status:            Yes    Values/Beliefs:  Spiritual, Cultural Beliefs, Christianity Practices, Values that affect care: no               Additional Information:  [facility] met and introduced self and CM services to Pt and wife who was visiting.  Pt is boarding in the ED.  Pt states that he was in the ED in March and went to [facility] for detox and had CD assessment completed. [facility] gave Pt contact information for [facility].  Pt states that another CD assessment was completed.  [facility] recommended IOP or In Pt CD treatment and was given name of [facility] and some place in [place].  Pt did not follow up and relapsed. Pt wants in Pt treatment.  Referral made to IP SUDS.  Pt interested in [facility].     2:21 PM  [facility] consulted with  SUDS and was advised to have Pt sign an CLIF and given contact information for [facility] as CD assessment would need to updated. [facility] met with Pt again to sign CLIF and was given the following information.  Pt states that when he left [facility] in March that he went to [facility] and had CD assessment and was given a list of place that Pt could go for treatment.  Pt was on the waiting list at [facility] as wife had found this place and 2 days after meeting with Crist[facility], an opening became available at [facility].  According to Pt, [facility] did a CD assessment and sent it to [facility] and was to have an opening around May 1st.  Pt went out of town on a planned trip \"and did not hear anything from [facility].\"  [facility] asked Pt if he would call [facility] to see if they have his information.  [facility] told Pt that I would call also to see if they had openings.  Pt did sign CLIF.  [facility] called [facility] [phone] and spoke with Nico who stated that [facility] is not in network for Pt insurance and that Pt was called and made aware. [facility] left  for [facility] - [place] [phone].     3:06 PM  Inez with [facility] returned call and states that Pt's CD assessment is good for 45 day is getting close to needing another " assessment.  Inez sent email link for Pt to log  Into to sign CLIF and will send out his CD assessment to where Pt wants it sent. SUDS gave names of [facility] and [facility].  SW met with Pt and gave him the contact information  and Pt and wife will start calling for openings.      MIRIAM Lux

## 2023-05-18 NOTE — PLAN OF CARE
Goal Outcome Evaluation:      Problem: Plan of Care - These are the overarching goals to be used throughout the patient stay.    Goal: Plan of Care Review  Description: The Plan of Care Review/Shift note should be completed every shift.  The Outcome Evaluation is a brief statement about your assessment that the patient is improving, declining, or no change.  This information will be displayed automatically on your shift note.  5/18/2023 0529 by Natalee Moreira RN  Outcome: Progressing     Problem: Alcohol Withdrawal  Goal: Alcohol Withdrawal Symptom Control  Outcome: Progressing       Patient denies pain.  Vitals stable.  CIWA score 2 and 1.  Using urinal in bed overnight.  Tele: SR.

## 2023-05-18 NOTE — PROGRESS NOTES
PRIMARY DIAGNOSIS: Etoh withdrawal  OUTPATIENT/OBSERVATION GOALS TO BE MET BEFORE DISCHARGE: NARA  1. Stable vital signs Yes    2. Tolerating diet:Yes  3. Pain controlled with oral pain medications:  Yes  4. Positive bowel sounds:  Yes  5. Voiding without difficulty:  Yes  6. Able to ambulate:  Yes  7. Provider specific discharge goals met:  No    Discharge Planner Nurse   Safe discharge environment identified: Yes  Barriers to discharge: Patient needs more monitoring for withdrawals from ETOH       Entered by: Kika Sweet RN 05/18/2023 3:08 PM     Please review provider order for any additional goals.   Nurse to notify provider when observation goals have been met and patient is ready for discharge.

## 2023-05-18 NOTE — PROGRESS NOTES
Patient being monitored for ETOH withdrawal. Patient stable and CIWA 0 last 8 hours. Patient been call for inpatient treatment. Vitals stable.

## 2023-05-18 NOTE — UTILIZATION REVIEW
"Admission Status; Secondary Review Determination     Under the authority of the Utilization Management Committee, the utilization review process indicated a secondary review on Aakash Mcclelland.  The review outcome is based on review of the medical records, discussions with staff, and applying clinical experience noted on the date of the review.     (x) Observation Status Appropriate - This patient does not meet hospital inpatient criteria and is placed in observation status. If this patient's primary payer is Medicare and was admitted as an inpatient, Condition Code 44 should be used and patient status changed to \"observation\".     RATIONALE FOR DETERMINATION   Aakash Mcclelland is a 33 yr old male with alcohol dependence, alcohol hepatitis and fatty liver disease as well as alcohol withdrawal seizures who presented 5/17/23 with alcohol withdrawal and ketosis.  CIWA was 16 but now improved.  Last drink 5/13/23.  Replacing Mag and Na improved after IVF.  Discussed with Dr. Gonzalez.As long as CIWA score low should be Observation but if climb again to >12 then change to inpatient.    The severity of illness, intensity of service provided, expected LOS and risk for adverse outcome make the care appropriate for further observation; however, doesn't meet criteria for hospital inpatient admission. Dr Gonzalez notified of this determination and agrees with downgrade of status.      The information on this document is developed by the utilization review team in order for the business office to ensure compliance.  This only denotes the appropriateness of proper admission status and does not reflect the quality of care rendered.         The definitions of Inpatient Status and Observation Status used in making the determination above are those provided in the CMS Coverage Manual, Chapter 1 and Chapter 6, section 70.4.      Sincerely,  Alyssia Schulz MD  Utilization Review  Physician Advisor  [facility]    "

## 2023-05-19 VITALS
HEART RATE: 69 BPM | TEMPERATURE: 98.5 F | HEIGHT: 68 IN | DIASTOLIC BLOOD PRESSURE: 68 MMHG | BODY MASS INDEX: 21.98 KG/M2 | SYSTOLIC BLOOD PRESSURE: 102 MMHG | OXYGEN SATURATION: 98 % | RESPIRATION RATE: 18 BRPM | WEIGHT: 145 LBS

## 2023-05-19 PROCEDURE — 96366 THER/PROPH/DIAG IV INF ADDON: CPT

## 2023-05-19 PROCEDURE — 96361 HYDRATE IV INFUSION ADD-ON: CPT

## 2023-05-19 PROCEDURE — 99239 HOSP IP/OBS DSCHRG MGMT >30: CPT | Performed by: STUDENT IN AN ORGANIZED HEALTH CARE EDUCATION/TRAINING PROGRAM

## 2023-05-19 PROCEDURE — 250N000013 HC RX MED GY IP 250 OP 250 PS 637: Performed by: INTERNAL MEDICINE

## 2023-05-19 PROCEDURE — G0378 HOSPITAL OBSERVATION PER HR: HCPCS

## 2023-05-19 RX ORDER — GABAPENTIN 300 MG/1
600 CAPSULE ORAL EVERY 8 HOURS
Qty: 12 CAPSULE | Refills: 0 | Status: SHIPPED | OUTPATIENT
Start: 2023-05-20 | End: 2023-08-22

## 2023-05-19 RX ORDER — GABAPENTIN 100 MG/1
100 CAPSULE ORAL EVERY 8 HOURS
Qty: 6 CAPSULE | Refills: 0 | Status: SHIPPED | OUTPATIENT
Start: 2023-05-24 | End: 2023-08-22

## 2023-05-19 RX ORDER — FOLIC ACID 1 MG/1
1 TABLET ORAL DAILY
Qty: 30 TABLET | Refills: 0 | Status: SHIPPED | OUTPATIENT
Start: 2023-05-19 | End: 2023-06-18

## 2023-05-19 RX ORDER — GABAPENTIN 300 MG/1
300 CAPSULE ORAL EVERY 8 HOURS
Qty: 6 CAPSULE | Refills: 0 | Status: SHIPPED | OUTPATIENT
Start: 2023-05-22 | End: 2023-08-22

## 2023-05-19 RX ORDER — LANOLIN ALCOHOL/MO/W.PET/CERES
100 CREAM (GRAM) TOPICAL DAILY
Qty: 30 TABLET | Refills: 0 | Status: SHIPPED | OUTPATIENT
Start: 2023-05-19 | End: 2023-06-18

## 2023-05-19 RX ADMIN — GABAPENTIN 900 MG: 300 CAPSULE ORAL at 06:36

## 2023-05-19 RX ADMIN — Medication 100 MG: at 10:45

## 2023-05-19 RX ADMIN — MULTIPLE VITAMINS W/ MINERALS TAB 1 TABLET: TAB at 10:45

## 2023-05-19 RX ADMIN — CLONIDINE HYDROCHLORIDE 0.1 MG: 0.1 TABLET ORAL at 06:37

## 2023-05-19 RX ADMIN — FOLIC ACID 1 MG: 1 TABLET ORAL at 10:45

## 2023-05-19 ASSESSMENT — ACTIVITIES OF DAILY LIVING (ADL)
ADLS_ACUITY_SCORE: 39

## 2023-05-19 NOTE — ED NOTES
AIDET performed, white board updated for rounding. Patient updated on plan of care. Patient's pain assessed. Call light within reach, bed in low position, side rails up. Visitor at bedside: partner.     Eating a meal with visitor at bedside. Denies needs at this time. Seizure pads in place, HOB at about 45 degrees.

## 2023-05-19 NOTE — DISCHARGE SUMMARY
[facility]  Hospitalist Discharge Summary      Date of Admission:  5/17/2023  Date of Discharge:  5/19/2023 11:03 AM  Discharging Provider: Ramya Thornton MD  Discharge Service: Hospitalist Service    Discharge Diagnoses   Alcohol withdrawal syndrome  Alcohol use disorder   history of alcohol withdrawal seizures  Alcoholic ketoacidosis  Hyponatremia  Hypomagnesemia   alcoholic hepatitis    Follow-ups Needed After Discharge   Follow-up Appointments     Follow-up and recommended labs and tests       Follow up with primary care provider, Ana Sanchez, within 7 days to   follow up on results.  The following labs/tests are recommended: CBC and   CMP.           Discharge Disposition   Discharged to home  Condition at discharge: Stable    Hospital Course   Aakash Mcclelland is a 33 year old male admitted on 5/17/2023. He has a history of alcohol dependence, alcohol hepatitis, fatty liver disease, and alcohol withdrawal seizures and was admitted with symptoms of alcohol withdrawal syndrome complicated by  alcoholic ketoacidosis, hyponatremia, hypomagnesemia, and dehydration.  Patient was managed with standard alcohol withdrawal protocol an on CIWA, supportive treatment with IV fluids, and electrolyte repletion.  Clinically improved patient was evaluated by social work and expressed interest in inpatient treatment.  The day of discharge he has in place intake assessment interview [facility] and a bed will be available for him next week on Wednesday, 5/24/2023.  Discharged in stable condition with wife to home      Consultations This Hospital Stay   CARE MANAGEMENT / SOCIAL WORK IP CONSULT  ADDICTION MEDICINE INPATIENT CONSULT  CHEMICAL DEPENDENCY IP CONSULT    Code Status   Prior    Time Spent on this Encounter   I, Ramya Thornton MD, personally saw the patient today and spent greater than 30 minutes discharging this patient.       Ramya Thornton,  MD PEREZ [facility] EMERGENCY ROOM  [address] [facility] [address]  Phone: [phone]  Fax: [phone]  ______________________________________________________________________    Physical Exam   Vital Signs: Temp: 98.5  F (36.9  C) Temp src: Oral BP: 102/68 Pulse: 69   Resp: 18 SpO2: 98 % O2 Device: None (Room air)    Weight: 145 lbs 0 oz  General: AAOx3, NAD  HEENT: Oral mucosa moist and non-erythematous, PERRLA, EOM intact  CV: RRR, normal S1S2, no murmur, clicks, rubs  Resp: Clear to auscultation bilaterally, no wheezes, rhonchi  Abd: Soft, non-tender, BS+, no masses appreciated  Extremities: Radial and pedal pulses intact and symmetric, no pedal edema  Neuro: No lateralizing symptoms or focal neurologic deficits         Primary Care Physician   Ana Sanchez    Discharge Orders      Reason for your hospital stay    33 year old male admitted on 5/17/2023 for alcohol withdrawal.     Follow-up and recommended labs and tests     Follow up with primary care provider, Ana Sanchez, within 7 days to follow up on results.  The following labs/tests are recommended: CBC and CMP.     Activity    Your activity upon discharge: activity as tolerated     Diet    Follow this diet upon discharge: Orders Placed This Encounter      Advance Diet as Tolerated: Regular Diet Adult       Significant Results and Procedures   Most Recent 3 CBC's:Recent Labs   Lab Test 05/18/23  0756 05/17/23  0858 03/21/23  0612   WBC 5.0 9.4 6.5   HGB 12.6* 16.3 13.2*   MCV 95 93 97   * 202 148*     Most Recent 3 BMP's:Recent Labs   Lab Test 05/18/23  0756 05/17/23  0858 05/01/23  0854    148* 146*   POTASSIUM 3.7 4.8 3.9   CHLORIDE 102 96* 105   CO2 28 18* 24   BUN 3* 6* 6.4   CR 0.71 0.87 0.70   ANIONGAP 8 34* 17*   [place] 8.2* 10.8* 9.6   GLC 87 107 73     Most Recent 2 LFT's:Recent Labs   Lab Test 05/18/23  0756 05/17/23  0858   * 204*   ALT 86* 158*   ALKPHOS 75 111   BILITOTAL 1.4*  1.9*     Most Recent 3 INR's:Recent Labs   Lab Test 05/17/23  1356 03/22/23  0900   INR 1.08 1.04   ,   Results for orders placed or performed during the hospital encounter of 03/20/23   CT Head w/o Contrast    Narrative    EXAM: CT HEAD W/O CONTRAST  LOCATION: [facility]  DATE/TIME: 3/20/2023 10:49 PM    INDICATION: First seizure  COMPARISON: None.  TECHNIQUE: Routine CT Head without IV contrast. Multiplanar reformats. Dose reduction techniques were used.    FINDINGS:  INTRACRANIAL CONTENTS: No intracranial hemorrhage, extraaxial collection, or mass effect.  No CT evidence of acute infarct. Normal parenchymal attenuation. Mild generalized volume loss. No hydrocephalus.     VISUALIZED ORBITS/SINUSES/MASTOIDS: No intraorbital abnormality. No paranasal sinus mucosal disease. No middle ear or mastoid effusion.    BONES/SOFT TISSUES: No acute abnormality.      Impression    IMPRESSION:  1.  No acute intracranial process.   US Abdomen Limited    Narrative    EXAM: US ABDOMEN LIMITED  LOCATION: [facility]  DATE/TIME: 3/21/2023 4:43 PM    INDICATION: RUQ US to assess for hepatobiliary disease. Seizure related alcohol withdrawal. Transaminitis.  COMPARISON: None.  TECHNIQUE: Limited abdominal ultrasound.    FINDINGS:    GALLBLADDER: Contracted due to nonfasting state. No stones identified.    BILE DUCTS: No biliary dilatation. The common duct measures 3 mm.    LIVER: Increased echogenicity from diffuse fatty infiltration. No focal mass.    RIGHT KIDNEY: No hydronephrosis.    PANCREAS: The visualized portions are normal.    No ascites.      Impression    IMPRESSION:  1.  Diffuse fatty infiltration of liver.             Discharge Medications   Discharge Medication List as of 5/19/2023 10:40 AM      START taking these medications    Details   folic acid (FOLVITE) 1 MG tablet Take 1 tablet (1 mg) by mouth daily for 30 days, Disp-30 tablet, R-0, E-Prescribe      !! gabapentin  (NEURONTIN) 100 MG capsule Take 1 capsule (100 mg) by mouth every 8 hours for 6 doses, Disp-6 capsule, R-0, E-Prescribe      !! gabapentin (NEURONTIN) 300 MG capsule Take 2 capsules (600 mg) by mouth every 8 hours for 6 doses, Disp-12 capsule, R-0, E-Prescribe      !! gabapentin (NEURONTIN) 300 MG capsule Take 1 capsule (300 mg) by mouth every 8 hours for 6 doses, Disp-6 capsule, R-0, E-Prescribe      thiamine (B-1) 100 MG tablet Take 1 tablet (100 mg) by mouth daily for 30 days, Disp-30 tablet, R-0, E-Prescribe       !! - Potential duplicate medications found. Please discuss with provider.      CONTINUE these medications which have NOT CHANGED    Details   !! gabapentin (NEURONTIN) 300 MG capsule Take 300 mg by mouth At Bedtime, Historical      multivitamin w/minerals (MULTI-VITAMIN) tablet Take 1 tablet by mouth daily, Historical       !! - Potential duplicate medications found. Please discuss with provider.      STOP taking these medications       ondansetron (ZOFRAN ODT) 4 MG ODT tab Comments:   Reason for Stopping:             Allergies   Allergies   Allergen Reactions     Cephalosporins Rash

## 2023-05-19 NOTE — CONSULTS
5/19/2023  Pt had a recent VIANCA CA completed at [facility]. I provided Unit SW inpatient options for pt to get into programming ASAP. Pt's Unit SW was able to set up an appt with Sacha for today and admission next week.    Radha Costa MA [facility] [place]  [phone]

## 2023-05-19 NOTE — PROGRESS NOTES
Care Management Discharge Note    Discharge Date: 05/19/2023       Discharge Disposition: Home, Inpatient Chemical Dependency, Outpatient Chemical Dependency    Discharge Services: Chemical Dependency Resources    Discharge DME: None    Discharge Transportation: family or friend will provide    Private pay costs discussed: Not applicable    Does the patient's insurance plan have a 3 day qualifying hospital stay waiver?  No    PAS Confirmation Code: N/A  Patient/family educated on Medicare website which has current facility and service quality ratings: yes    Education Provided on the Discharge Plan: Yes  Persons Notified of Discharge Plans: Pt   Patient/Family in Agreement with the Plan: yes    Handoff Referral Completed: No    Additional Information:  Pt is discharging home today.  Pt states that he has a 1:30 PM phone appointment today with Sacha and will have a bed for him on Wednesday.  No other [facility] needs identified.  Family to transport.         MIRIAM Lux

## 2023-05-19 NOTE — PROGRESS NOTES
Pt discharged to home, denied having pain. IV removed. Went over AVS. All personal belongings sent with pt.

## 2023-08-22 ENCOUNTER — HOSPITAL ENCOUNTER (OUTPATIENT)
Facility: CLINIC | Age: 33
Setting detail: OBSERVATION
Discharge: HOME | End: 2023-08-24
Attending: STUDENT IN AN ORGANIZED HEALTH CARE EDUCATION/TRAINING PROGRAM | Admitting: INTERNAL MEDICINE
Payer: COMMERCIAL

## 2023-08-22 DIAGNOSIS — F10.230 ALCOHOL DEPENDENCE WITH UNCOMPLICATED WITHDRAWAL (H): ICD-10-CM

## 2023-08-22 DIAGNOSIS — F10.930 ALCOHOL WITHDRAWAL SEIZURE WITHOUT COMPLICATION (H): Primary | ICD-10-CM

## 2023-08-22 DIAGNOSIS — R11.0 NAUSEA: ICD-10-CM

## 2023-08-22 DIAGNOSIS — R56.9 ALCOHOL WITHDRAWAL SEIZURE WITHOUT COMPLICATION (H): Primary | ICD-10-CM

## 2023-08-22 LAB
ALBUMIN SERPL-MCNC: 4.9 G/DL (ref 3.5–5)
ALP SERPL-CCNC: 105 U/L (ref 45–120)
ALT SERPL W P-5'-P-CCNC: 264 U/L (ref 0–45)
ANION GAP SERPL CALCULATED.3IONS-SCNC: 24 MMOL/L (ref 5–18)
AST SERPL W P-5'-P-CCNC: 300 U/L (ref 0–40)
BASOPHILS # BLD AUTO: 0.1 10E3/UL (ref 0–0.2)
BASOPHILS NFR BLD AUTO: 1 %
BILIRUB DIRECT SERPL-MCNC: 0.4 MG/DL
BILIRUB SERPL-MCNC: 1.1 MG/DL (ref 0–1)
BUN SERPL-MCNC: 7 MG/DL (ref 8–22)
CALCIUM SERPL-MCNC: 9.1 MG/DL (ref 8.5–10.5)
CHLORIDE BLD-SCNC: 98 MMOL/L (ref 98–107)
CO2 SERPL-SCNC: 20 MMOL/L (ref 22–31)
CREAT SERPL-MCNC: 0.8 MG/DL (ref 0.7–1.3)
EOSINOPHIL # BLD AUTO: 0 10E3/UL (ref 0–0.7)
EOSINOPHIL NFR BLD AUTO: 0 %
ERYTHROCYTE [DISTWIDTH] IN BLOOD BY AUTOMATED COUNT: 13.5 % (ref 10–15)
ETHANOL SERPL-MCNC: 156 MG/DL
GFR SERPL CREATININE-BSD FRML MDRD: >90 ML/MIN/1.73M2
GLUCOSE BLD-MCNC: 80 MG/DL (ref 70–125)
HCT VFR BLD AUTO: 51.1 % (ref 40–53)
HGB BLD-MCNC: 18.2 G/DL (ref 13.3–17.7)
HOLD SPECIMEN: NORMAL
IMM GRANULOCYTES # BLD: 0 10E3/UL
IMM GRANULOCYTES NFR BLD: 0 %
LIPASE SERPL-CCNC: 16 U/L (ref 0–52)
LYMPHOCYTES # BLD AUTO: 1.2 10E3/UL (ref 0.8–5.3)
LYMPHOCYTES NFR BLD AUTO: 22 %
MAGNESIUM SERPL-MCNC: 1.6 MG/DL (ref 1.8–2.6)
MCH RBC QN AUTO: 31.4 PG (ref 26.5–33)
MCHC RBC AUTO-ENTMCNC: 35.6 G/DL (ref 31.5–36.5)
MCV RBC AUTO: 88 FL (ref 78–100)
MONOCYTES # BLD AUTO: 0.3 10E3/UL (ref 0–1.3)
MONOCYTES NFR BLD AUTO: 5 %
NEUTROPHILS # BLD AUTO: 3.8 10E3/UL (ref 1.6–8.3)
NEUTROPHILS NFR BLD AUTO: 72 %
NRBC # BLD AUTO: 0 10E3/UL
NRBC BLD AUTO-RTO: 0 /100
PLATELET # BLD AUTO: 233 10E3/UL (ref 150–450)
POTASSIUM BLD-SCNC: 3.7 MMOL/L (ref 3.5–5)
PROT SERPL-MCNC: 8.4 G/DL (ref 6–8)
RBC # BLD AUTO: 5.79 10E6/UL (ref 4.4–5.9)
SODIUM SERPL-SCNC: 142 MMOL/L (ref 136–145)
WBC # BLD AUTO: 5.3 10E3/UL (ref 4–11)

## 2023-08-22 PROCEDURE — 80053 COMPREHEN METABOLIC PANEL: CPT | Performed by: STUDENT IN AN ORGANIZED HEALTH CARE EDUCATION/TRAINING PROGRAM

## 2023-08-22 PROCEDURE — 250N000013 HC RX MED GY IP 250 OP 250 PS 637: Performed by: INTERNAL MEDICINE

## 2023-08-22 PROCEDURE — 96376 TX/PRO/DX INJ SAME DRUG ADON: CPT

## 2023-08-22 PROCEDURE — G0378 HOSPITAL OBSERVATION PER HR: HCPCS

## 2023-08-22 PROCEDURE — 82077 ASSAY SPEC XCP UR&BREATH IA: CPT | Performed by: STUDENT IN AN ORGANIZED HEALTH CARE EDUCATION/TRAINING PROGRAM

## 2023-08-22 PROCEDURE — 96374 THER/PROPH/DIAG INJ IV PUSH: CPT

## 2023-08-22 PROCEDURE — 36415 COLL VENOUS BLD VENIPUNCTURE: CPT | Performed by: STUDENT IN AN ORGANIZED HEALTH CARE EDUCATION/TRAINING PROGRAM

## 2023-08-22 PROCEDURE — 83735 ASSAY OF MAGNESIUM: CPT | Performed by: INTERNAL MEDICINE

## 2023-08-22 PROCEDURE — 250N000011 HC RX IP 250 OP 636: Mod: JZ | Performed by: STUDENT IN AN ORGANIZED HEALTH CARE EDUCATION/TRAINING PROGRAM

## 2023-08-22 PROCEDURE — 258N000003 HC RX IP 258 OP 636: Performed by: STUDENT IN AN ORGANIZED HEALTH CARE EDUCATION/TRAINING PROGRAM

## 2023-08-22 PROCEDURE — 99223 1ST HOSP IP/OBS HIGH 75: CPT | Performed by: INTERNAL MEDICINE

## 2023-08-22 PROCEDURE — 85025 COMPLETE CBC W/AUTO DIFF WBC: CPT | Performed by: STUDENT IN AN ORGANIZED HEALTH CARE EDUCATION/TRAINING PROGRAM

## 2023-08-22 PROCEDURE — C9113 INJ PANTOPRAZOLE SODIUM, VIA: HCPCS | Mod: JZ | Performed by: STUDENT IN AN ORGANIZED HEALTH CARE EDUCATION/TRAINING PROGRAM

## 2023-08-22 PROCEDURE — 96375 TX/PRO/DX INJ NEW DRUG ADDON: CPT

## 2023-08-22 PROCEDURE — 99285 EMERGENCY DEPT VISIT HI MDM: CPT | Mod: 25

## 2023-08-22 PROCEDURE — 96361 HYDRATE IV INFUSION ADD-ON: CPT

## 2023-08-22 PROCEDURE — 82248 BILIRUBIN DIRECT: CPT | Performed by: STUDENT IN AN ORGANIZED HEALTH CARE EDUCATION/TRAINING PROGRAM

## 2023-08-22 PROCEDURE — 83690 ASSAY OF LIPASE: CPT | Performed by: STUDENT IN AN ORGANIZED HEALTH CARE EDUCATION/TRAINING PROGRAM

## 2023-08-22 RX ORDER — NALTREXONE HYDROCHLORIDE 50 MG/1
50 TABLET, FILM COATED ORAL DAILY
COMMUNITY

## 2023-08-22 RX ORDER — LORAZEPAM 2 MG/ML
2 INJECTION INTRAMUSCULAR ONCE
Status: COMPLETED | OUTPATIENT
Start: 2023-08-22 | End: 2023-08-22

## 2023-08-22 RX ORDER — ONDANSETRON 4 MG/1
4 TABLET, ORALLY DISINTEGRATING ORAL EVERY 6 HOURS PRN
Qty: 12 TABLET | Refills: 0 | Status: SHIPPED | OUTPATIENT
Start: 2023-08-22

## 2023-08-22 RX ORDER — GABAPENTIN 300 MG/1
300 CAPSULE ORAL 2 TIMES DAILY
COMMUNITY

## 2023-08-22 RX ORDER — MAGNESIUM OXIDE 400 MG/1
400 TABLET ORAL DAILY
Qty: 30 TABLET | Refills: 0 | Status: SHIPPED | OUTPATIENT
Start: 2023-08-22

## 2023-08-22 RX ORDER — MAGNESIUM OXIDE 400 MG/1
400 TABLET ORAL EVERY 4 HOURS
Status: COMPLETED | OUTPATIENT
Start: 2023-08-22 | End: 2023-08-23

## 2023-08-22 RX ORDER — KETOROLAC TROMETHAMINE 15 MG/ML
15 INJECTION, SOLUTION INTRAMUSCULAR; INTRAVENOUS ONCE
Status: COMPLETED | OUTPATIENT
Start: 2023-08-22 | End: 2023-08-22

## 2023-08-22 RX ORDER — LORAZEPAM 1 MG/1
TABLET ORAL
Qty: 14 TABLET | Refills: 0 | Status: SHIPPED | OUTPATIENT
Start: 2023-08-22 | End: 2023-08-27

## 2023-08-22 RX ORDER — MAGNESIUM GLYCINATE 100 MG
100 TABLET ORAL DAILY
Status: ON HOLD | COMMUNITY
End: 2023-08-24

## 2023-08-22 RX ORDER — ONDANSETRON 2 MG/ML
4 INJECTION INTRAMUSCULAR; INTRAVENOUS ONCE
Status: COMPLETED | OUTPATIENT
Start: 2023-08-22 | End: 2023-08-22

## 2023-08-22 RX ADMIN — LORAZEPAM 2 MG: 2 INJECTION INTRAMUSCULAR; INTRAVENOUS at 19:16

## 2023-08-22 RX ADMIN — ONDANSETRON 4 MG: 2 INJECTION INTRAMUSCULAR; INTRAVENOUS at 19:14

## 2023-08-22 RX ADMIN — KETOROLAC TROMETHAMINE 15 MG: 15 INJECTION, SOLUTION INTRAMUSCULAR; INTRAVENOUS at 20:23

## 2023-08-22 RX ADMIN — SODIUM CHLORIDE, POTASSIUM CHLORIDE, SODIUM LACTATE AND CALCIUM CHLORIDE 1000 ML: 600; 310; 30; 20 INJECTION, SOLUTION INTRAVENOUS at 19:18

## 2023-08-22 RX ADMIN — PANTOPRAZOLE SODIUM 40 MG: 40 INJECTION, POWDER, FOR SOLUTION INTRAVENOUS at 19:10

## 2023-08-22 RX ADMIN — LORAZEPAM 2 MG: 2 INJECTION INTRAMUSCULAR; INTRAVENOUS at 20:25

## 2023-08-22 RX ADMIN — Medication 400 MG: at 23:35

## 2023-08-22 RX ADMIN — SODIUM CHLORIDE, POTASSIUM CHLORIDE, SODIUM LACTATE AND CALCIUM CHLORIDE 1000 ML: 600; 310; 30; 20 INJECTION, SOLUTION INTRAVENOUS at 20:26

## 2023-08-22 ASSESSMENT — ACTIVITIES OF DAILY LIVING (ADL)
ADLS_ACUITY_SCORE: 35

## 2023-08-22 NOTE — ED TRIAGE NOTES
Pt presents w/ c/o ETOH withdrawal. Pt reports last drink was 12 hrs ago, drinks 375 mL hard liquor daily. Pt report ETOH seizure withdrawal hx. Pt tremulous in triage, reports headache and vomiting. ABCs intact.      Triage Assessment       Row Name 08/22/23 8368       Triage Assessment (Adult)    Airway WDL WDL       Respiratory WDL    Respiratory WDL WDL       Skin Circulation/Temperature WDL    Skin Circulation/Temperature WDL WDL       Cardiac WDL    Cardiac WDL WDL       Peripheral/Neurovascular WDL    Peripheral Neurovascular WDL WDL       Cognitive/Neuro/Behavioral WDL    Cognitive/Neuro/Behavioral WDL WDL

## 2023-08-22 NOTE — ED PROVIDER NOTES
EMERGENCY DEPARTMENT ENCOUNTER       ED Course & Medical Decision Making     6:53 PM I met with the patient, obtained history, performed an initial exam, and discussed options and plan for diagnostics and treatment here in the ED.      Final Impression  33 year old male presents with wife for evaluation of alcohol withdrawal.  Longstanding history of alcohol abuse, had reportedly been sober for the last 3 months doing an intensive outpatient treatment program through [facility] and it had been going well until he unfortunately relapsed about a month ago.  Has been drinking 375 mL of alcohol daily, though has been hiding it from his wife who just became aware of it within the last 24 hours.  Wife is present, appears supportive.  Initially discussed potentially treating symptoms with benzos fluids, nausea meds and possible discharge home with taper of benzos which patient and wife initially were agreeable to.  However, after 2 doses of benzos, 2 L of fluids, patient showing not enough improvement for patient's wife to feel comfortable going home as he does have a history of alcohol withdrawal seizures, does not seem reasonable to force discharge upon them if they are not completely on board with home taper of benzos thus will be admitted for alcohol withdrawal.  Labs today show mild elevations of LFTs, slightly higher than priors, the likely commensurate with his history of alcohol use.    Prior to making a final disposition on this patient the results of patient's tests and other diagnostic studies were discussed with the patient. All questions were answered. Patient expressed understanding of the plan and was amenable to it.    Medical Decision Making    History:  Supplemental history from: Wife  External Record(s) reviewed as documented below;  3/20/2023 admission at [facility] for alcohol withdrawal, did note reviewed    Work Up:  Chart documentation includes differential considered and any EKGs or imaging  independently interpreted by provider, where specified.    External consultation:  Discussion of management with another provider: Hospitalist    Complicating factors:  Care impacted by chronic illness: N/A  Care affected by social determinants of health: Alcohol Abuse and/or Recreational Drug Use    Disposition considerations: Admit.      Medications   lactated ringers BOLUS 1,000 mL (0 mLs Intravenous Stopped 8/22/23 2023)   pantoprazole (PROTONIX) IV push injection 40 mg (40 mg Intravenous $Given 8/22/23 1910)   ondansetron (ZOFRAN) injection 4 mg (4 mg Intravenous $Given 8/22/23 1914)   LORazepam (ATIVAN) injection 2 mg (2 mg Intravenous $Given 8/22/23 1916)   lactated ringers BOLUS 1,000 mL (0 mLs Intravenous Stopped 8/22/23 2143)   LORazepam (ATIVAN) injection 2 mg (2 mg Intravenous $Given 8/22/23 2025)   ketorolac (TORADOL) injection 15 mg (15 mg Intravenous $Given 8/22/23 2023)       New Prescriptions    LORAZEPAM (ATIVAN) 1 MG TABLET    Take 1 tablet (1 mg) by mouth every 6 hours as needed for anxiety for 1 day, THEN 1 tablet (1 mg) every 8 hours as needed for anxiety for 2 days, THEN 1 tablet (1 mg) every 12 hours as needed for anxiety.    MAGNESIUM OXIDE (MAG-OX) 400 MG TABLET    Take 1 tablet (400 mg) by mouth daily    ONDANSETRON (ZOFRAN ODT) 4 MG ODT TAB    Take 1 tablet (4 mg) by mouth every 6 hours as needed for nausea or vomiting       Final Impression     1. Alcohol dependence with uncomplicated withdrawal (H)    2. Nausea      Chief Complaint     Chief Complaint   Patient presents with    Alcohol Problem     Pt presents w/ c/o ETOH withdrawal. Pt reports last drink was 12 hrs ago, drinks 375 mL hard liquor daily. Pt report ETOH seizure withdrawal hx. Pt tremulous in triage, reports headache and vomiting. ABCs intact.     HPI     Aakash Mcclelland is a 33 year old male with a pertinent history of alcohol withdrawal seizure without complications, alcoholic hepatitis, and alcohol ketosis who presents  "for evaluation of alcohol withdrawal symptoms.    Patient has been experiencing \"extreme\" nausea, vomiting, headaches, fatigue, and shakiness since his last drink around 3 am. He reports he relapsed a month ago and has been persistently drinking 375 ml of vodka daily until he stopped completely today. He states he had the \"desire\" to quit drinking. Patient has had inpatient and outpatient treatment ongoing for 3 months. He is currently in intensive outpatient treatment at the [facility], however he didn't go the last two days. His wife was not aware of his relapsed until his counselor called today. Patient has a history of seizure from withdrawal symptoms back in march. He denies any other symptoms at this time.    I, Amna Price am serving as a scribe to document services personally performed by Dr. Leonardo Weinstein MD, based on my observation and the provider's statements to me. I, Dr. Leonardo Weinstein MD attest that Amna Price is acting in a scribe capacity, has observed my performance of the services and has documented them in accordance with my direction.    Physical Exam     /80   Pulse 78   Temp 98.2  F (36.8  C)   Resp 23   SpO2 98%   Constitutional: Awake, alert, in no acute distress.  Head: Normocephalic, atraumatic.    ENT: Mucous membranes moist.  Eyes: Mild conjunctival injection bilaterally  Respiratory: Respirations even, unlabored, in no acute respiratory distress.  Cardiovascular: Borderline sinus tachycardia.  Good peripheral perfusion.  GI: Abdomen soft, non-tender.  Mild epigastric pain, though otherwise nontender, nondistended  Musculoskeletal: Moves all 4 extremities equally.  Integument: Warm, dry.  Neurologic: Alert & oriented x 3.  Mildly tremulous.  Normal speech. Grossly normal motor and sensory function. No focal deficits noted.  Psychiatric: Normal mood    Labs & Imaging     Results for orders placed or performed during the hospital encounter of 08/22/23   Extra Blue " Top Tube   Result Value Ref Range    Hold Specimen JIC    Extra Red Top Tube   Result Value Ref Range    Hold Specimen JIC    Extra Green Top (Lithium Heparin) Tube   Result Value Ref Range    Hold Specimen JIC    Extra Purple Top Tube   Result Value Ref Range    Hold Specimen JIC    Basic metabolic panel   Result Value Ref Range    Sodium 142 136 - 145 mmol/L    Potassium 3.7 3.5 - 5.0 mmol/L    Chloride 98 98 - 107 mmol/L    Carbon Dioxide (CO2) 20 (L) 22 - 31 mmol/L    Anion Gap 24 (H) 5 - 18 mmol/L    Urea Nitrogen 7 (L) 8 - 22 mg/dL    Creatinine 0.80 0.70 - 1.30 mg/dL    Calcium 9.1 8.5 - 10.5 mg/dL    Glucose 80 70 - 125 mg/dL    GFR Estimate >90 >60 mL/min/1.73m2   Result Value Ref Range    Lipase 16 0 - 52 U/L   Hepatic function panel   Result Value Ref Range    Bilirubin Total 1.1 (H) 0.0 - 1.0 mg/dL    Bilirubin Direct 0.4 <=0.5 mg/dL    Protein Total 8.4 (H) 6.0 - 8.0 g/dL    Albumin 4.9 3.5 - 5.0 g/dL    Alkaline Phosphatase 105 45 - 120 U/L     (H) 0 - 40 U/L     (H) 0 - 45 U/L   Alcohol level blood   Result Value Ref Range    Alcohol, Blood 156 (H) None detected mg/dL   CBC with platelets and differential   Result Value Ref Range    WBC Count 5.3 4.0 - 11.0 10e3/uL    RBC Count 5.79 4.40 - 5.90 10e6/uL    Hemoglobin 18.2 (H) 13.3 - 17.7 g/dL    Hematocrit 51.1 40.0 - 53.0 %    MCV 88 78 - 100 fL    MCH 31.4 26.5 - 33.0 pg    MCHC 35.6 31.5 - 36.5 g/dL    RDW 13.5 10.0 - 15.0 %    Platelet Count 233 150 - 450 10e3/uL    % Neutrophils 72 %    % Lymphocytes 22 %    % Monocytes 5 %    % Eosinophils 0 %    % Basophils 1 %    % Immature Granulocytes 0 %    NRBCs per 100 WBC 0 <1 /100    Absolute Neutrophils 3.8 1.6 - 8.3 10e3/uL    Absolute Lymphocytes 1.2 0.8 - 5.3 10e3/uL    Absolute Monocytes 0.3 0.0 - 1.3 10e3/uL    Absolute Eosinophils 0.0 0.0 - 0.7 10e3/uL    Absolute Basophils 0.1 0.0 - 0.2 10e3/uL    Absolute Immature Granulocytes 0.0 <=0.4 10e3/uL    Absolute NRBCs 0.0 10e3/uL         Leonardo Weinstein MD  08/22/23 5697

## 2023-08-23 LAB
ALBUMIN SERPL-MCNC: 3.8 G/DL (ref 3.5–5)
ALP SERPL-CCNC: 74 U/L (ref 45–120)
ALT SERPL W P-5'-P-CCNC: 172 U/L (ref 0–45)
AMPHETAMINES UR QL SCN: NORMAL
ANION GAP SERPL CALCULATED.3IONS-SCNC: 14 MMOL/L (ref 5–18)
AST SERPL W P-5'-P-CCNC: 166 U/L (ref 0–40)
ATRIAL RATE - MUSE: 74 BPM
BARBITURATES UR QL: NORMAL
BASOPHILS # BLD AUTO: 0 10E3/UL (ref 0–0.2)
BASOPHILS NFR BLD AUTO: 1 %
BENZODIAZ UR QL: NORMAL
BILIRUB SERPL-MCNC: 2 MG/DL (ref 0–1)
BUN SERPL-MCNC: 9 MG/DL (ref 8–22)
CALCIUM SERPL-MCNC: 8.4 MG/DL (ref 8.5–10.5)
CANNABINOIDS UR QL SCN: NORMAL
CHLORIDE BLD-SCNC: 97 MMOL/L (ref 98–107)
CO2 SERPL-SCNC: 24 MMOL/L (ref 22–31)
COCAINE UR QL: NORMAL
CREAT SERPL-MCNC: 0.77 MG/DL (ref 0.7–1.3)
CREAT UR-MCNC: 58 MG/DL
DIASTOLIC BLOOD PRESSURE - MUSE: 71 MMHG
EOSINOPHIL # BLD AUTO: 0.2 10E3/UL (ref 0–0.7)
EOSINOPHIL NFR BLD AUTO: 3 %
ERYTHROCYTE [DISTWIDTH] IN BLOOD BY AUTOMATED COUNT: 13.3 % (ref 10–15)
GFR SERPL CREATININE-BSD FRML MDRD: >90 ML/MIN/1.73M2
GLUCOSE BLD-MCNC: 55 MG/DL (ref 70–125)
GLUCOSE BLDC GLUCOMTR-MCNC: 127 MG/DL (ref 70–99)
GLUCOSE BLDC GLUCOMTR-MCNC: 75 MG/DL (ref 70–99)
HAV IGM SERPL QL IA: NONREACTIVE
HBV CORE IGM SERPL QL IA: NONREACTIVE
HBV SURFACE AG SERPL QL IA: NONREACTIVE
HCT VFR BLD AUTO: 39.6 % (ref 40–53)
HCV AB SERPL QL IA: NONREACTIVE
HGB BLD-MCNC: 14 G/DL (ref 13.3–17.7)
IMM GRANULOCYTES # BLD: 0 10E3/UL
IMM GRANULOCYTES NFR BLD: 0 %
INTERPRETATION ECG - MUSE: NORMAL
LYMPHOCYTES # BLD AUTO: 1.5 10E3/UL (ref 0.8–5.3)
LYMPHOCYTES NFR BLD AUTO: 30 %
MAGNESIUM SERPL-MCNC: 1.5 MG/DL (ref 1.8–2.6)
MAGNESIUM SERPL-MCNC: 2.2 MG/DL (ref 1.8–2.6)
MCH RBC QN AUTO: 31.7 PG (ref 26.5–33)
MCHC RBC AUTO-ENTMCNC: 35.4 G/DL (ref 31.5–36.5)
MCV RBC AUTO: 90 FL (ref 78–100)
MONOCYTES # BLD AUTO: 0.5 10E3/UL (ref 0–1.3)
MONOCYTES NFR BLD AUTO: 10 %
NEUTROPHILS # BLD AUTO: 2.7 10E3/UL (ref 1.6–8.3)
NEUTROPHILS NFR BLD AUTO: 56 %
NRBC # BLD AUTO: 0 10E3/UL
NRBC BLD AUTO-RTO: 0 /100
OPIATES UR QL SCN: NORMAL
OXYCODONE UR QL: NORMAL
P AXIS - MUSE: 63 DEGREES
PCP UR QL SCN: NORMAL
PHOSPHATE SERPL-MCNC: 3.4 MG/DL (ref 2.5–4.5)
PLATELET # BLD AUTO: 161 10E3/UL (ref 150–450)
POTASSIUM BLD-SCNC: 4.2 MMOL/L (ref 3.5–5)
PR INTERVAL - MUSE: 104 MS
PROT SERPL-MCNC: 6.2 G/DL (ref 6–8)
QRS DURATION - MUSE: 86 MS
QT - MUSE: 436 MS
QTC - MUSE: 483 MS
R AXIS - MUSE: 80 DEGREES
RBC # BLD AUTO: 4.41 10E6/UL (ref 4.4–5.9)
SODIUM SERPL-SCNC: 135 MMOL/L (ref 136–145)
SYSTOLIC BLOOD PRESSURE - MUSE: 117 MMHG
T AXIS - MUSE: 85 DEGREES
VENTRICULAR RATE- MUSE: 74 BPM
WBC # BLD AUTO: 4.9 10E3/UL (ref 4–11)

## 2023-08-23 PROCEDURE — 84100 ASSAY OF PHOSPHORUS: CPT | Performed by: INTERNAL MEDICINE

## 2023-08-23 PROCEDURE — 85025 COMPLETE CBC W/AUTO DIFF WBC: CPT | Performed by: INTERNAL MEDICINE

## 2023-08-23 PROCEDURE — 83735 ASSAY OF MAGNESIUM: CPT | Performed by: FAMILY MEDICINE

## 2023-08-23 PROCEDURE — 258N000003 HC RX IP 258 OP 636: Performed by: FAMILY MEDICINE

## 2023-08-23 PROCEDURE — 82962 GLUCOSE BLOOD TEST: CPT

## 2023-08-23 PROCEDURE — 96375 TX/PRO/DX INJ NEW DRUG ADDON: CPT

## 2023-08-23 PROCEDURE — 93005 ELECTROCARDIOGRAM TRACING: CPT | Performed by: INTERNAL MEDICINE

## 2023-08-23 PROCEDURE — 258N000001 HC RX 258: Performed by: FAMILY MEDICINE

## 2023-08-23 PROCEDURE — 96376 TX/PRO/DX INJ SAME DRUG ADON: CPT

## 2023-08-23 PROCEDURE — 250N000011 HC RX IP 250 OP 636: Mod: JZ | Performed by: FAMILY MEDICINE

## 2023-08-23 PROCEDURE — G0378 HOSPITAL OBSERVATION PER HR: HCPCS

## 2023-08-23 PROCEDURE — 80307 DRUG TEST PRSMV CHEM ANLYZR: CPT | Performed by: FAMILY MEDICINE

## 2023-08-23 PROCEDURE — 99233 SBSQ HOSP IP/OBS HIGH 50: CPT | Performed by: FAMILY MEDICINE

## 2023-08-23 PROCEDURE — 80074 ACUTE HEPATITIS PANEL: CPT | Performed by: INTERNAL MEDICINE

## 2023-08-23 PROCEDURE — 36415 COLL VENOUS BLD VENIPUNCTURE: CPT | Performed by: FAMILY MEDICINE

## 2023-08-23 PROCEDURE — 80053 COMPREHEN METABOLIC PANEL: CPT | Performed by: INTERNAL MEDICINE

## 2023-08-23 PROCEDURE — 250N000011 HC RX IP 250 OP 636: Mod: JZ | Performed by: INTERNAL MEDICINE

## 2023-08-23 PROCEDURE — 258N000003 HC RX IP 258 OP 636: Performed by: INTERNAL MEDICINE

## 2023-08-23 PROCEDURE — 96361 HYDRATE IV INFUSION ADD-ON: CPT

## 2023-08-23 PROCEDURE — 83735 ASSAY OF MAGNESIUM: CPT | Performed by: INTERNAL MEDICINE

## 2023-08-23 PROCEDURE — C9113 INJ PANTOPRAZOLE SODIUM, VIA: HCPCS | Mod: JZ | Performed by: INTERNAL MEDICINE

## 2023-08-23 PROCEDURE — 250N000013 HC RX MED GY IP 250 OP 250 PS 637: Performed by: INTERNAL MEDICINE

## 2023-08-23 PROCEDURE — 36415 COLL VENOUS BLD VENIPUNCTURE: CPT | Performed by: INTERNAL MEDICINE

## 2023-08-23 RX ORDER — DEXTROSE MONOHYDRATE 25 G/50ML
25 INJECTION, SOLUTION INTRAVENOUS ONCE
Status: COMPLETED | OUTPATIENT
Start: 2023-08-23 | End: 2023-08-23

## 2023-08-23 RX ORDER — HALOPERIDOL 5 MG/ML
2.5-5 INJECTION INTRAMUSCULAR EVERY 6 HOURS PRN
Status: DISCONTINUED | OUTPATIENT
Start: 2023-08-23 | End: 2023-08-24 | Stop reason: HOSPADM

## 2023-08-23 RX ORDER — LORAZEPAM 1 MG/1
1-2 TABLET ORAL EVERY 30 MIN PRN
Status: DISCONTINUED | OUTPATIENT
Start: 2023-08-23 | End: 2023-08-24 | Stop reason: HOSPADM

## 2023-08-23 RX ORDER — HYDROCODONE BITARTRATE AND ACETAMINOPHEN 5; 325 MG/1; MG/1
1 TABLET ORAL EVERY 4 HOURS PRN
Status: DISCONTINUED | OUTPATIENT
Start: 2023-08-23 | End: 2023-08-23

## 2023-08-23 RX ORDER — MULTIPLE VITAMINS W/ MINERALS TAB 9MG-400MCG
1 TAB ORAL DAILY
Status: DISCONTINUED | OUTPATIENT
Start: 2023-08-23 | End: 2023-08-24 | Stop reason: HOSPADM

## 2023-08-23 RX ORDER — ONDANSETRON 4 MG/1
4 TABLET, FILM COATED ORAL EVERY 6 HOURS PRN
Status: DISCONTINUED | OUTPATIENT
Start: 2023-08-23 | End: 2023-08-24 | Stop reason: HOSPADM

## 2023-08-23 RX ORDER — FLUMAZENIL 0.1 MG/ML
0.2 INJECTION, SOLUTION INTRAVENOUS
Status: DISCONTINUED | OUTPATIENT
Start: 2023-08-23 | End: 2023-08-24 | Stop reason: HOSPADM

## 2023-08-23 RX ORDER — MAGNESIUM SULFATE 4 G/50ML
4 INJECTION INTRAVENOUS ONCE
Status: COMPLETED | OUTPATIENT
Start: 2023-08-23 | End: 2023-08-23

## 2023-08-23 RX ORDER — KETOROLAC TROMETHAMINE 15 MG/ML
15 INJECTION, SOLUTION INTRAMUSCULAR; INTRAVENOUS EVERY 6 HOURS PRN
Status: COMPLETED | OUTPATIENT
Start: 2023-08-23 | End: 2023-08-23

## 2023-08-23 RX ORDER — LORAZEPAM 2 MG/ML
1-2 INJECTION INTRAMUSCULAR EVERY 30 MIN PRN
Status: DISCONTINUED | OUTPATIENT
Start: 2023-08-23 | End: 2023-08-24 | Stop reason: HOSPADM

## 2023-08-23 RX ORDER — GABAPENTIN 300 MG/1
600 CAPSULE ORAL AT BEDTIME
Status: DISCONTINUED | OUTPATIENT
Start: 2023-08-23 | End: 2023-08-24 | Stop reason: HOSPADM

## 2023-08-23 RX ORDER — ONDANSETRON 2 MG/ML
4 INJECTION INTRAMUSCULAR; INTRAVENOUS EVERY 6 HOURS PRN
Status: DISCONTINUED | OUTPATIENT
Start: 2023-08-23 | End: 2023-08-24 | Stop reason: HOSPADM

## 2023-08-23 RX ORDER — ONDANSETRON 4 MG/1
4 TABLET, ORALLY DISINTEGRATING ORAL EVERY 6 HOURS PRN
Status: DISCONTINUED | OUTPATIENT
Start: 2023-08-23 | End: 2023-08-23

## 2023-08-23 RX ORDER — FOLIC ACID 1 MG/1
1 TABLET ORAL DAILY
Status: DISCONTINUED | OUTPATIENT
Start: 2023-08-23 | End: 2023-08-24 | Stop reason: HOSPADM

## 2023-08-23 RX ORDER — ONDANSETRON 2 MG/ML
4 INJECTION INTRAMUSCULAR; INTRAVENOUS EVERY 6 HOURS PRN
Status: DISCONTINUED | OUTPATIENT
Start: 2023-08-23 | End: 2023-08-23

## 2023-08-23 RX ORDER — MAGNESIUM SULFATE 4 G/50ML
4 INJECTION INTRAVENOUS ONCE
Status: DISCONTINUED | OUTPATIENT
Start: 2023-08-23 | End: 2023-08-23

## 2023-08-23 RX ORDER — PANTOPRAZOLE SODIUM 40 MG/1
40 TABLET, DELAYED RELEASE ORAL
Status: DISCONTINUED | OUTPATIENT
Start: 2023-08-24 | End: 2023-08-24 | Stop reason: HOSPADM

## 2023-08-23 RX ORDER — SODIUM CHLORIDE 9 MG/ML
INJECTION, SOLUTION INTRAVENOUS CONTINUOUS
Status: DISCONTINUED | OUTPATIENT
Start: 2023-08-23 | End: 2023-08-23

## 2023-08-23 RX ORDER — CLONIDINE HYDROCHLORIDE 0.1 MG/1
0.1 TABLET ORAL EVERY 8 HOURS
Status: DISCONTINUED | OUTPATIENT
Start: 2023-08-23 | End: 2023-08-23

## 2023-08-23 RX ORDER — MAGNESIUM GLYCINATE 100 MG
100 TABLET ORAL DAILY
Status: DISCONTINUED | OUTPATIENT
Start: 2023-08-23 | End: 2023-08-23

## 2023-08-23 RX ORDER — GABAPENTIN 300 MG/1
300 CAPSULE ORAL 2 TIMES DAILY
Status: DISCONTINUED | OUTPATIENT
Start: 2023-08-23 | End: 2023-08-24 | Stop reason: HOSPADM

## 2023-08-23 RX ADMIN — GABAPENTIN 600 MG: 300 CAPSULE ORAL at 21:40

## 2023-08-23 RX ADMIN — GABAPENTIN 300 MG: 300 CAPSULE ORAL at 08:25

## 2023-08-23 RX ADMIN — MAGNESIUM SULFATE HEPTAHYDRATE 4 G: 4 INJECTION, SOLUTION INTRAVENOUS at 08:27

## 2023-08-23 RX ADMIN — GABAPENTIN 300 MG: 300 CAPSULE ORAL at 13:15

## 2023-08-23 RX ADMIN — SODIUM CHLORIDE: 9 INJECTION, SOLUTION INTRAVENOUS at 03:26

## 2023-08-23 RX ADMIN — CLONIDINE HYDROCHLORIDE 0.1 MG: 0.1 TABLET ORAL at 03:25

## 2023-08-23 RX ADMIN — PANTOPRAZOLE SODIUM 40 MG: 40 INJECTION, POWDER, FOR SOLUTION INTRAVENOUS at 08:24

## 2023-08-23 RX ADMIN — DEXTROSE AND SODIUM CHLORIDE: 5; 900 INJECTION, SOLUTION INTRAVENOUS at 08:27

## 2023-08-23 RX ADMIN — DEXTROSE AND SODIUM CHLORIDE: 5; 900 INJECTION, SOLUTION INTRAVENOUS at 20:07

## 2023-08-23 RX ADMIN — HYDROCODONE BITARTRATE AND ACETAMINOPHEN 1 TABLET: 5; 325 TABLET ORAL at 01:55

## 2023-08-23 RX ADMIN — Medication 100 MG: at 08:25

## 2023-08-23 RX ADMIN — Medication 5 MG: at 21:40

## 2023-08-23 RX ADMIN — ONDANSETRON HYDROCHLORIDE 4 MG: 4 TABLET, FILM COATED ORAL at 13:26

## 2023-08-23 RX ADMIN — KETOROLAC TROMETHAMINE 15 MG: 15 INJECTION, SOLUTION INTRAMUSCULAR; INTRAVENOUS at 20:02

## 2023-08-23 RX ADMIN — DEXTROSE MONOHYDRATE 25 ML: 25 INJECTION, SOLUTION INTRAVENOUS at 08:24

## 2023-08-23 RX ADMIN — MULTIPLE VITAMINS W/ MINERALS TAB 1 TABLET: TAB at 08:26

## 2023-08-23 RX ADMIN — Medication 400 MG: at 03:25

## 2023-08-23 RX ADMIN — FOLIC ACID 1 MG: 1 TABLET ORAL at 08:26

## 2023-08-23 ASSESSMENT — ACTIVITIES OF DAILY LIVING (ADL)
ADLS_ACUITY_SCORE: 37
ADLS_ACUITY_SCORE: 35
ADLS_ACUITY_SCORE: 37
DEPENDENT_IADLS:: INDEPENDENT
ADLS_ACUITY_SCORE: 37
ADLS_ACUITY_SCORE: 35
ADLS_ACUITY_SCORE: 37
ADLS_ACUITY_SCORE: 37

## 2023-08-23 NOTE — CONSULTS
Care Management Initial Consult    General Information  Assessment completed with: Patient,    Type of CM/SW Visit: Initial Assessment  Primary Care Provider verified and updated as needed: Yes   Readmission within the last 30 days: no previous admission in last 30 days   Reason for Consult: community resources, discharge planning, health care directive, substance use concerns, transportation  Advance Care Planning: Advance Care Planning Reviewed: questions answered        Communication Assessment  Patient's communication style: spoken language (English or Bilingual)        Cognitive  Cognitive/Neuro/Behavioral:   WDL  Level of Consciousness: alert    Arousal Level: opens eyes spontaneously    Orientation: oriented x 4       Best Language: 0 - No aphasia    Speech: spontaneous, clear    Living Environment:   People in home: spouse     Current living Arrangements: house      Able to return to prior arrangements: yes     Family/Social Support:  Care provided by: self  Provides care for: no one  Marital Status:   Wife  Lydia       Description of Support System: Supportive, Involved    Support Assessment: Adequate family and caregiver support, Adequate social supports    Current Resources:   Patient receiving home care services: No  Community Resources: Chemical Dependency Services  Equipment currently used at home: none  Supplies currently used at home: None    Employment/Financial:  Employment Status: other (see comments) (Undetermined.)     Financial Concerns: No concerns identified   Referral to Financial Worker: No     Does the patient's insurance plan have a 3 day qualifying hospital stay waiver?  No    Lifestyle & Psychosocial Needs:  Social Determinants of Health     Tobacco Use: Low Risk  (8/22/2023)    Patient History     Smoking Tobacco Use: Never     Smokeless Tobacco Use: Never     Passive Exposure: Not on file   Alcohol Use: Not on file   Financial Resource Strain: Not on file   Food Insecurity: Not  "on file   Transportation Needs: Not on file   Physical Activity: Not on file   Stress: Not on file   Social Connections: Not on file   Intimate Partner Violence: Not on file   Depression: Not on file   Housing Stability: Not on file     Functional Status:  Prior to admission patient needed assistance:   Dependent ADLs:: Independent  Dependent IADLs:: Independent  Assessment of Functional Status: Not at  functional baseline    Mental Health Status:  Mental Health Status: No Current Concerns       Chemical Dependency Status:  Chemical Dependency Status: Current Concern  Chemical Dependency Management: Other (see comment) (Current Intensive Outpatient Tx for three hrs/day at four days/week.)        Values/Beliefs:  Spiritual, Cultural Beliefs, Bahai Practices, Values that affect care: no (None identified.)             Additional Information:  Writer met with pt per ordered consult to introduce Care Management(CM), obtain an initial assessment, and provide Chemical dependency(CD) resources. Pt resides in a house with his wife and states total I/ADL independence when at baseline. Pt was offered CD resources and declined as currently active with treatment. Pt stated he currently attends IOP CD Tx 4 days/wk for 3 hrs/day. Pt declined additional CD resources as he already knows where to find help if desired. No concerns expressed in regard to discharge and eventual return home.    8/22 per CHRIS Argueta.-\"33 year old male presents with wife for evaluation of alcohol withdrawal.  Longstanding history of alcohol abuse, had reportedly been sober for the last 3 months doing an intensive outpatient treatment program through [facility] and it had been going well until he unfortunately relapsed about a month ago.  Has been drinking 375 mL of alcohol daily, though has been hiding it from his wife who just became aware of it within the last 24 hours.  Wife is present, appears supportive.  Initially discussed potentially treating " "symptoms with benzos fluids, nausea meds and possible discharge home with taper of benzos which patient and wife initially were agreeable to.  However, after 2 doses of benzos, 2 L of fluids, patient showing not enough improvement for patient's wife to feel comfortable going home as he does have a history of alcohol withdrawal seizures, does not seem reasonable to force discharge upon them if they are not completely on board with home taper of benzos thus will be admitted for alcohol withdrawal.  Labs today show mild elevations of LFTs, slightly higher than priors, the likely commensurate with his history of alcohol use.\"    No pending consults. Anticipate improvement and return home at time of discharge. CM to follow for changes in current discharge disposition. Wife to transport.    Roger Humphreys RN      "

## 2023-08-23 NOTE — PROGRESS NOTES
"PRIMARY DIAGNOSIS: \"GENERIC\" NURSING  OUTPATIENT/OBSERVATION GOALS TO BE MET BEFORE DISCHARGE:  ADLs back to baseline: Yes    Activity and level of assistance: Ambulating independently.    Pain status: Pain free.    Return to near baseline physical activity: Yes     A/O x 4, VSS on RA. Denies pain. CIWA at 0800: 2. Critical BG this am of 55, one-time dose of Dextrose administered, D5W/NS infusing at 100 ml/hr. Poor appetite, able to tolerate water, cranberry juice, and saltines. CIWA scores of 2 & 3 during this shift ([id number]).   "

## 2023-08-23 NOTE — DISCHARGE INSTRUCTIONS
Below is a list of FREE Mental Health Options in the [facility] Area:    [facility] ([facility])  Serves those in emotional crisis with 24-hour, seven-day-a-week crisis counseling, assessment, referral, and medication management.   Suicidal: [phone] Consultation: [phone]  [address] [place] [address] 24/7 Crisis Intervention Center     [facility]  [phone]  Serves those in need of free outpatient mental health care  Hours: Mon, Wed, Fri 1-3pm; Mon-Thurs 6:30-8:30pm    [facility]  [address] [place] [id number]  [phone]     To schedule an appointment with a mental health therapist:  [facility] (ph: [phone])

## 2023-08-23 NOTE — PROGRESS NOTES
"PRIMARY DIAGNOSIS: \"GENERIC\" NURSING  OUTPATIENT/OBSERVATION GOALS TO BE MET BEFORE DISCHARGE:  ADLs back to baseline: Yes    Activity and level of assistance: Ambulating independently.    Pain status: Pain free.    Return to near baseline physical activity: Yes     A/O x 4, VSS on RA. Denies pain. CIWA at 0800: 2. Critical BG this am of 55, one-time dose of Dextrose administered, D5W/NS infusing at 100 ml/hr. Poor appetite, able to tolerate water, cranberry juice, and saltines. CIWA scores of 2 & 3 during this shift ([phone]).    "

## 2023-08-23 NOTE — PROGRESS NOTES
[facility] MEDICINE PROGRESS NOTE      Identification/Summary: Aakash Mcclelland is a 33 year old male with a past medical history of alcohol abuse, delirium tremens, alcohol-related gastritis, alcoholic hepatitis, recent inpatient treatment at [facility] in 5/2023, came with heavy alcohol use and alcohol withdrawal symptoms with nausea and vomiting.  CIWA protocol initiated.  Minimal alcohol withdrawal symptoms today.  Absolute alcohol cessation is advised.   will provide more resources for treatment.  Magnesium replacement for hypomagnesemia at 1.5.  Status post IV hydration.  Anion gap metabolic acidosis corrected.  LFTs are improved.  CT scan revealed diffuse fatty infiltration of liver.  History of hypoglycemia in AM.  Received D50.  Regular diet started.    Assessment and Plan:  Alcohol abuse and dependence  Alcohol withdrawal  CIWA protocol  Folic acid and thiamine supplement  Absolute alcohol cessation  Inpatient CD treatment at his hold on in 5/2023   will provide more resources  History of delirium tremens  Seizure precaution    Alcoholic hepatitis  LFTs are improved    Moderate dehydration  Anion gap metabolic acidosis, corrected  Status post IV hydration    Hypomagnesemia, resume replacement  Alcoholic gastritis  Resume PPI    Code full  DVT prophylaxis  Early ambulation and SCDs  Barrier to discharge  Awaiting for more clinical improvement.  Anticipated discharge in 1 day      Clinically Significant Risk Factors Present on Admission          # Hypocalcemia: Lowest Ca = 8.4 mg/dL in last 2 days, will monitor and replace as appropriate   # Hypomagnesemia: Lowest Mg = 1.5 mg/dL in last 2 days, will replace as needed  # Anion Gap Metabolic Acidosis: Highest Anion Gap = 24 mmol/L in last 2 days, will monitor and treat as appropriate           # Overweight: Estimated body mass index is 25.02 kg/m  as calculated from the following:    Height as of this  "encounter: 1.676 m (5' 6\").    Weight as of this encounter: 70.3 kg (155 lb).                Merline Tran MD  [facility] Medicine  [facility]  Phone: #[phone]  Securely message with the Vocera Web Console (learn more here)  Text page via Fastnote Paging/Directory     Interval History/Subjective:  Resting comfortably.  Nausea and vomiting are improved.  Minimal alcohol withdrawal symptoms.  Has generalized weakness.  Denies headache, chest pain, breathing difficulty, palpitation or abdominal pain.    Physical Exam/Objective:  Temp:  [98.1  F (36.7  C)-98.2  F (36.8  C)] 98.1  F (36.7  C)  Pulse:  [] 63  Resp:  [17-35] 18  BP: (109-158)/() 109/57  SpO2:  [93 %-99 %] 95 %  Body mass index is 25.02 kg/m .    GENERAL:  Alert, appears comfortable, in no acute distress, appears stated age   HEAD:  Normocephalic, without obvious abnormality, atraumatic   EYES:  PERRL, conjunctiva clear,   EOM's intact   NOSE: Nares normal,  mucosa normal, no drainage   THROAT: Lips, mucosa,  gums normal, mouth moist   NECK: Supple, symmetrical, trachea midline   BACK:   Symmetric, no curvature, ROM normal   LUNGS:   Clear to auscultation bilaterally, no rales, rhonchi, or wheezing, symmetric chest rise on inhalation, respirations unlabored   CHEST WALL:  No tenderness or deformity   HEART:  Regular rate and rhythm, S1 and S2 normal, no murmur    ABDOMEN:   Soft, non-tender, bowel sounds active , no masses, no organomegaly, no rebound or guarding   EXTREMITIES: No  edema    SKIN: No rashes   NEURO: Alert, oriented x3, moves all four extremities freely   PSYCH: Cooperative, behavior is appropriate      Data reviewed today: I personally reviewed all new medications, labs, imaging/diagnostics reports over the past 24 hours. Pertinent findings include:    Imaging:   Abdomen US  Diffuse fatty infiltration of liver.     Head CT  No acute intracranial process.     Labs:  Most Recent 3 " CBC's:  Recent Labs   Lab Test 08/23/23  0659 08/22/23  1804 05/18/23  0756   WBC 4.9 5.3 5.0   HGB 14.0 18.2* 12.6*   MCV 90 88 95    233 124*     Most Recent 3 BMP's:  Recent Labs   Lab Test 08/23/23  1003 08/23/23  0659 08/22/23  1804 05/18/23  0756   NA  --  135* 142 138   POTASSIUM  --  4.2 3.7 3.7   CHLORIDE  --  97* 98 102   CO2  --  24 20* 28   BUN  --  9 7* 3*   CR  --  0.77 0.80 0.71   ANIONGAP  --  14 24* 8   VIGRINIA  --  8.4* 9.1 8.2*   * 55* 80 87     Most Recent 2 LFT's:  Recent Labs   Lab Test 08/23/23  0659 08/22/23  1804   * 300*   * 264*   ALKPHOS 74 105   BILITOTAL 2.0* 1.1*       Medications:   Personally Reviewed.  Medications    dextrose 5% and 0.9% NaCl 100 mL/hr at 08/23/23 0827      folic acid  1 mg Oral Daily    gabapentin  300 mg Oral BID    gabapentin  600 mg Oral At Bedtime    magnesium sulfate  4 g Intravenous Once    multivitamin w/minerals  1 tablet Oral Daily    pantoprazole  40 mg Intravenous QAM AC    thiamine  100 mg Oral Daily      Total time spent 50 min

## 2023-08-23 NOTE — PLAN OF CARE
Uneventful overnight for the patient. CIWA has been 3 and 2 overnight. His only complaint has been of a mild headache that comes and goes. VSS, patient on RA, K+ and mag protocols in place.

## 2023-08-23 NOTE — H&P
[facility] MEDICINE ADMISSION HISTORY AND PHYSICAL       Assessment & Plan      1. Alcohol withdrawal, appears to be mild, chronic ETOH abuse, recent treatment and relapsed. Can't tell why. No other use of prohibited drugs     Abdomen is benign on exams. No pain and no vomiting anymore. No hallucinations     Elev AST/ALT 6x - consider alcoholic hepatitis, ETOH related gastritis, if worsening and recurrence of pain, consider abdominal imaging. Alcoholic liver disease     He is on naltrexone and also on Gabapentin    - CIWA started in ED and we will continue, including home MELO dosing, however, if not well controlled, re-order MELO based on protocol   - Qtc check - EKG, tele  - CMP in AM   - Holding Naltrexone for now --- we will have AM doc decide safe to resume - he uses this for ETOH   - acute hepatitis check     2. Hypomagnesemia  - protocol         VTE prophylaxis --  SCDs,   Diet --  regular   Code Status -- Full  Barriers to discharge -- Admitting medical condition/s  Discharge Disposition and goals --  Unable to determine at this point, pending clinical progress and response to treatment. Patient may need transfer to SNF or [facility] if unsafe to go home and needed treatment inappropriate at home setting OR may need home health care evaluation if care can be delivered at home settings. Consider referral to care manager/    PPE - I was wearing PPE when I met the patient including but not limited to - N95 mask, Gloves, and/or Safety glasses.  Admission Status -- Observation    Care plan was created based on available information and patient's condition at the time of encounter. This was discussed with the patient and/or family members using layman's terms, including counseling/education and they have agreed to proceed. I recommend to revise care plan and to review history if there is change in condition and/or new clinical information that is not available during my encounter.  At the end of night shift, this case will be presented to the [facility] Hospitalist.       75 minutes spent by me on the date of service doing chart review, history, exam, diagnostic test results interpretation, documentation & further activities per the note.      Doe Gonzalez MD, MPH, FACP, [facility]  Internal Medicine - Hospitalist        Chief Complaint Tremors      HISTORY     - Patient is in ED - 6. He is here with alcohol withdrawal symptoms  - Drinks Vodka daily, and stopped around 3AM yesterday. Few hours later, felt he was wthdrawing, with anxiety, had headaches/vomiting, and abdominal pain. Also reported, tremors, and feels he will go into seizure. He had history of seizure related to ETOH.     - His intake was poor, and feels dehydrated. No use of prohibited drugs  - He was sober for a few months. Unable to tell why back on drinking  - Takes daily gabapentin and also naltrexone.     - ED course - he was started on ETOH protocol. AST/ALT - 6x elev. Lipase normal. ETOH 150s     - ROS ---  No weakness. No CP or SOB. No palpitations.  No urinary symptoms. No bleeding symptoms. No weight loss. Rest of 12 point ROS was reviewed and negative.       Past Medical History     History reviewed. No pertinent past medical history.      Surgical History     Past Surgical History:   Procedure Laterality Date     IR MISCELLANEOUS PROCEDURE  7/27/2009     IR MISCELLANEOUS PROCEDURE  2/26/2012        Family History      No family history on file.      Social History      .  Social History     Socioeconomic History     Marital status:      Spouse name: Not on file     Number of children: Not on file     Years of education: Not on file     Highest education level: Not on file   Occupational History     Not on file   Tobacco Use     Smoking status: Never     Smokeless tobacco: Never   Substance and Sexual Activity     Alcohol use: Not on file     Drug use: Not on file     Sexual activity: Not on file   Other Topics  Concern     Not on file   Social History Narrative     Not on file     Social Determinants of Health     Financial Resource Strain: Not on file   Food Insecurity: Not on file   Transportation Needs: Not on file   Physical Activity: Not on file   Stress: Not on file   Social Connections: Not on file   Intimate Partner Violence: Not on file   Housing Stability: Not on file          Allergies        Allergies   Allergen Reactions     Cephalosporins Rash         Prior to Admission Medications      No current facility-administered medications on file prior to encounter.  gabapentin (NEURONTIN) 100 MG capsule, Take 1 capsule (100 mg) by mouth every 8 hours for 6 doses  gabapentin (NEURONTIN) 300 MG capsule, Take 2 capsules (600 mg) by mouth every 8 hours for 6 doses  gabapentin (NEURONTIN) 300 MG capsule, Take 1 capsule (300 mg) by mouth every 8 hours for 6 doses  gabapentin (NEURONTIN) 300 MG capsule, Take 300 mg by mouth At Bedtime  multivitamin w/minerals (MULTI-VITAMIN) tablet, Take 1 tablet by mouth daily            Review of Systems     A 12 point comprehensive review of systems was negative except as noted above in HPI.    PHYSICAL EXAMINATION       Vitals      Vitals: /80   Pulse 78   Temp 98.2  F (36.8  C)   Resp 23   SpO2 98%   BMI= There is no height or weight on file to calculate BMI.      Examination     General Appearance:  Alert, cooperative, no distress  Head:    Normocephalic, without obvious abnormality, atraumatic  EENT:  PERRL, conjunctiva/corneas clear, EOM's intact.   Neck:   Supple, symmetrical, trachea midline, no adenopathy; no NVE  Back:  Symmetric, no curvature, no CVA tenderness  Chest/Lungs: Clear to auscultation bilaterally, respirations unlabored, No tenderness or deformity. No abdominal breathing or use of accessory muscles.   Heart:    Regular rate and rhythm, S1 and S2 normal, no murmur, rub   or gallop  Abdomen: Soft, non-tender, bowel sounds active all four quadrants, not  peritoneal on palpation. Not distended  Extremities:  Extremities normal, atraumatic, no swelling   Skin:  Skin color, texture, turgor normal, no rashes or lesion  Neurologic:  Awake and alert, No lateralizing or localizing signs       Pertinent Lab     Results for orders placed or performed during the hospital encounter of 08/22/23   Extra Blue Top Tube   Result Value Ref Range    Hold Specimen JIC    Extra Red Top Tube   Result Value Ref Range    Hold Specimen JIC    Extra Green Top (Lithium Heparin) Tube   Result Value Ref Range    Hold Specimen JIC    Extra Purple Top Tube   Result Value Ref Range    Hold Specimen JIC    Basic metabolic panel   Result Value Ref Range    Sodium 142 136 - 145 mmol/L    Potassium 3.7 3.5 - 5.0 mmol/L    Chloride 98 98 - 107 mmol/L    Carbon Dioxide (CO2) 20 (L) 22 - 31 mmol/L    Anion Gap 24 (H) 5 - 18 mmol/L    Urea Nitrogen 7 (L) 8 - 22 mg/dL    Creatinine 0.80 0.70 - 1.30 mg/dL    Calcium 9.1 8.5 - 10.5 mg/dL    Glucose 80 70 - 125 mg/dL    GFR Estimate >90 >60 mL/min/1.73m2   Result Value Ref Range    Lipase 16 0 - 52 U/L   Hepatic function panel   Result Value Ref Range    Bilirubin Total 1.1 (H) 0.0 - 1.0 mg/dL    Bilirubin Direct 0.4 <=0.5 mg/dL    Protein Total 8.4 (H) 6.0 - 8.0 g/dL    Albumin 4.9 3.5 - 5.0 g/dL    Alkaline Phosphatase 105 45 - 120 U/L     (H) 0 - 40 U/L     (H) 0 - 45 U/L   Alcohol level blood   Result Value Ref Range    Alcohol, Blood 156 (H) None detected mg/dL   CBC with platelets and differential   Result Value Ref Range    WBC Count 5.3 4.0 - 11.0 10e3/uL    RBC Count 5.79 4.40 - 5.90 10e6/uL    Hemoglobin 18.2 (H) 13.3 - 17.7 g/dL    Hematocrit 51.1 40.0 - 53.0 %    MCV 88 78 - 100 fL    MCH 31.4 26.5 - 33.0 pg    MCHC 35.6 31.5 - 36.5 g/dL    RDW 13.5 10.0 - 15.0 %    Platelet Count 233 150 - 450 10e3/uL    % Neutrophils 72 %    % Lymphocytes 22 %    % Monocytes 5 %    % Eosinophils 0 %    % Basophils 1 %    % Immature Granulocytes 0  %    NRBCs per 100 WBC 0 <1 /100    Absolute Neutrophils 3.8 1.6 - 8.3 10e3/uL    Absolute Lymphocytes 1.2 0.8 - 5.3 10e3/uL    Absolute Monocytes 0.3 0.0 - 1.3 10e3/uL    Absolute Eosinophils 0.0 0.0 - 0.7 10e3/uL    Absolute Basophils 0.1 0.0 - 0.2 10e3/uL    Absolute Immature Granulocytes 0.0 <=0.4 10e3/uL    Absolute NRBCs 0.0 10e3/uL           Pertinent Radiology

## 2023-08-23 NOTE — PHARMACY-ADMISSION MEDICATION HISTORY
Pharmacist Admission Medication History    Admission medication history is complete. The information provided in this note is only as accurate as the sources available at the time of the update.    Medication reconciliation/reorder completed by provider prior to medication history? No    Information Source(s): Patient, Family member, Clinic records, and CareEverywhere/SureScripts via in-person    Pertinent Information: None    Changes made to PTA medication list:  Added: naltrexone, magnesium supplement  Deleted: None  Changed: gabapentin dose    Medication History Completed By: Meseret Morrison, [facility]  8/22/2023 11:04 PM    Prior to Admission medications    Medication Sig Last Dose Taking? Auth Provider Long Term End Date   gabapentin (NEURONTIN) 300 MG capsule Take 300 mg by mouth 2 times daily Morning and afternoon. 8/21/2023 Yes Unknown, Entered By History Yes    gabapentin (NEURONTIN) 300 MG capsule Take 600 mg by mouth At Bedtime 8/21/2023 Yes Unknown, Entered By History Yes    LORazepam (ATIVAN) 1 MG tablet Take 1 tablet (1 mg) by mouth every 6 hours as needed for anxiety for 1 day, THEN 1 tablet (1 mg) every 8 hours as needed for anxiety for 2 days, THEN 1 tablet (1 mg) every 12 hours as needed for anxiety.  Yes Leonardo Weinstein MD  8/27/23   Magnesium Bisglycinate (MAG GLYCINATE) 100 MG TABS Take 100 mg by mouth daily 8/21/2023 Yes Unknown, Entered By History     magnesium oxide (MAG-OX) 400 MG tablet Take 1 tablet (400 mg) by mouth daily  Yes Leonardo Weinstein MD     naltrexone (DEPADE/REVIA) 50 MG tablet Take 50 mg by mouth daily 8/21/2023 Yes Unknown, Entered By History Yes    ondansetron (ZOFRAN ODT) 4 MG ODT tab Take 1 tablet (4 mg) by mouth every 6 hours as needed for nausea or vomiting  Yes Leonardo Weinstein MD No

## 2023-08-24 VITALS
HEIGHT: 66 IN | DIASTOLIC BLOOD PRESSURE: 94 MMHG | WEIGHT: 147.6 LBS | RESPIRATION RATE: 16 BRPM | OXYGEN SATURATION: 97 % | TEMPERATURE: 98.5 F | BODY MASS INDEX: 23.72 KG/M2 | HEART RATE: 72 BPM | SYSTOLIC BLOOD PRESSURE: 122 MMHG

## 2023-08-24 LAB
ALBUMIN SERPL-MCNC: 3.5 G/DL (ref 3.5–5)
ALP SERPL-CCNC: 74 U/L (ref 45–120)
ALT SERPL W P-5'-P-CCNC: 138 U/L (ref 0–45)
ANION GAP SERPL CALCULATED.3IONS-SCNC: 11 MMOL/L (ref 5–18)
AST SERPL W P-5'-P-CCNC: 134 U/L (ref 0–40)
BILIRUB SERPL-MCNC: 1.4 MG/DL (ref 0–1)
BUN SERPL-MCNC: 6 MG/DL (ref 8–22)
CALCIUM SERPL-MCNC: 8.2 MG/DL (ref 8.5–10.5)
CHLORIDE BLD-SCNC: 102 MMOL/L (ref 98–107)
CO2 SERPL-SCNC: 26 MMOL/L (ref 22–31)
CREAT SERPL-MCNC: 0.72 MG/DL (ref 0.7–1.3)
ERYTHROCYTE [DISTWIDTH] IN BLOOD BY AUTOMATED COUNT: 13.2 % (ref 10–15)
GFR SERPL CREATININE-BSD FRML MDRD: >90 ML/MIN/1.73M2
GLUCOSE BLD-MCNC: 94 MG/DL (ref 70–125)
GLUCOSE BLDC GLUCOMTR-MCNC: 118 MG/DL (ref 70–99)
GLUCOSE BLDC GLUCOMTR-MCNC: 78 MG/DL (ref 70–99)
GLUCOSE BLDC GLUCOMTR-MCNC: 92 MG/DL (ref 70–99)
GLUCOSE BLDC GLUCOMTR-MCNC: 92 MG/DL (ref 70–99)
HCT VFR BLD AUTO: 39 % (ref 40–53)
HGB BLD-MCNC: 13.8 G/DL (ref 13.3–17.7)
MAGNESIUM SERPL-MCNC: 1.8 MG/DL (ref 1.8–2.6)
MCH RBC QN AUTO: 31.9 PG (ref 26.5–33)
MCHC RBC AUTO-ENTMCNC: 35.4 G/DL (ref 31.5–36.5)
MCV RBC AUTO: 90 FL (ref 78–100)
PLATELET # BLD AUTO: 151 10E3/UL (ref 150–450)
POTASSIUM BLD-SCNC: 3.6 MMOL/L (ref 3.5–5)
PROT SERPL-MCNC: 5.7 G/DL (ref 6–8)
RBC # BLD AUTO: 4.32 10E6/UL (ref 4.4–5.9)
SODIUM SERPL-SCNC: 139 MMOL/L (ref 136–145)
WBC # BLD AUTO: 4.3 10E3/UL (ref 4–11)

## 2023-08-24 PROCEDURE — 96361 HYDRATE IV INFUSION ADD-ON: CPT

## 2023-08-24 PROCEDURE — G0378 HOSPITAL OBSERVATION PER HR: HCPCS

## 2023-08-24 PROCEDURE — 258N000003 HC RX IP 258 OP 636: Performed by: FAMILY MEDICINE

## 2023-08-24 PROCEDURE — 83735 ASSAY OF MAGNESIUM: CPT | Performed by: FAMILY MEDICINE

## 2023-08-24 PROCEDURE — 250N000013 HC RX MED GY IP 250 OP 250 PS 637: Performed by: INTERNAL MEDICINE

## 2023-08-24 PROCEDURE — 85027 COMPLETE CBC AUTOMATED: CPT | Performed by: FAMILY MEDICINE

## 2023-08-24 PROCEDURE — 80053 COMPREHEN METABOLIC PANEL: CPT | Performed by: FAMILY MEDICINE

## 2023-08-24 PROCEDURE — 82962 GLUCOSE BLOOD TEST: CPT

## 2023-08-24 PROCEDURE — 82310 ASSAY OF CALCIUM: CPT | Performed by: FAMILY MEDICINE

## 2023-08-24 PROCEDURE — 250N000013 HC RX MED GY IP 250 OP 250 PS 637: Performed by: FAMILY MEDICINE

## 2023-08-24 PROCEDURE — 36415 COLL VENOUS BLD VENIPUNCTURE: CPT | Performed by: FAMILY MEDICINE

## 2023-08-24 PROCEDURE — 99239 HOSP IP/OBS DSCHRG MGMT >30: CPT | Performed by: FAMILY MEDICINE

## 2023-08-24 RX ORDER — MULTIPLE VITAMINS W/ MINERALS TAB 9MG-400MCG
1 TAB ORAL DAILY
Qty: 30 TABLET | Refills: 0 | Status: SHIPPED | OUTPATIENT
Start: 2023-08-25

## 2023-08-24 RX ORDER — LANOLIN ALCOHOL/MO/W.PET/CERES
100 CREAM (GRAM) TOPICAL DAILY
Qty: 30 TABLET | Refills: 0 | Status: SHIPPED | OUTPATIENT
Start: 2023-08-25

## 2023-08-24 RX ORDER — MAGNESIUM OXIDE 400 MG/1
400 TABLET ORAL DAILY
Qty: 30 TABLET | Refills: 0 | Status: SHIPPED | OUTPATIENT
Start: 2023-08-24

## 2023-08-24 RX ORDER — FOLIC ACID 1 MG/1
1 TABLET ORAL DAILY
Qty: 30 TABLET | Refills: 0 | Status: SHIPPED | OUTPATIENT
Start: 2023-08-25

## 2023-08-24 RX ORDER — MAGNESIUM OXIDE 400 MG/1
400 TABLET ORAL EVERY 4 HOURS
Status: COMPLETED | OUTPATIENT
Start: 2023-08-24 | End: 2023-08-24

## 2023-08-24 RX ORDER — POTASSIUM CHLORIDE 1500 MG/1
20 TABLET, EXTENDED RELEASE ORAL ONCE
Status: COMPLETED | OUTPATIENT
Start: 2023-08-24 | End: 2023-08-24

## 2023-08-24 RX ADMIN — Medication 400 MG: at 09:03

## 2023-08-24 RX ADMIN — PANTOPRAZOLE SODIUM 40 MG: 40 TABLET, DELAYED RELEASE ORAL at 07:19

## 2023-08-24 RX ADMIN — Medication 100 MG: at 07:19

## 2023-08-24 RX ADMIN — DEXTROSE AND SODIUM CHLORIDE: 5; 900 INJECTION, SOLUTION INTRAVENOUS at 05:56

## 2023-08-24 RX ADMIN — LORAZEPAM 1 MG: 1 TABLET ORAL at 04:42

## 2023-08-24 RX ADMIN — POTASSIUM CHLORIDE 20 MEQ: 1500 TABLET, EXTENDED RELEASE ORAL at 05:36

## 2023-08-24 RX ADMIN — MULTIPLE VITAMINS W/ MINERALS TAB 1 TABLET: TAB at 07:18

## 2023-08-24 RX ADMIN — Medication 400 MG: at 05:36

## 2023-08-24 RX ADMIN — FOLIC ACID 1 MG: 1 TABLET ORAL at 07:18

## 2023-08-24 RX ADMIN — GABAPENTIN 300 MG: 300 CAPSULE ORAL at 07:18

## 2023-08-24 RX ADMIN — GABAPENTIN 300 MG: 300 CAPSULE ORAL at 13:05

## 2023-08-24 ASSESSMENT — ACTIVITIES OF DAILY LIVING (ADL)
ADLS_ACUITY_SCORE: 37

## 2023-08-24 NOTE — PROGRESS NOTES
"PRIMARY DIAGNOSIS: \"GENERIC\" NURSING  OUTPATIENT/OBSERVATION GOALS TO BE MET BEFORE DISCHARGE:  ADLs back to baseline: Yes    Activity and level of assistance: Ambulating independently.    Pain status: Pain free.    Return to near baseline physical activity: Yes     Discharge Planner Nurse   Safe discharge environment identified: Yes  Barriers to discharge: Yes       Entered by: Meseret Reddy RN 08/24/2023 6:27 AM   VSS. NARA scoring 2. Appears to be sleeping well. Remains on D5NS @ 100mL/hr.  "

## 2023-08-24 NOTE — DISCHARGE SUMMARY
[facility] MEDICINE  DISCHARGE SUMMARY     Primary Care Physician: Ana Sanchez  Admission Date: 8/22/2023   Discharge Provider: Merline Tran MD Discharge Date: 8/24/2023   Diet:   Snacks/Supplements Adult: Ensure Clear; Between Meals   Regular Diet Adult      Code Status: Full Code   Activity: DCACTIVITY: Activity as tolerated        Condition at Discharge: Stable     REASON FOR PRESENTATION(See Admission Note for Details)   Alcohol abuse, dependence, withdrawal    PRINCIPAL & ACTIVE DISCHARGE DIAGNOSES     Abuse and dependence  Alcohol withdrawal  Alcoholic hepatitis  Episode of hypoglycemia due to poor oral intake, resolved  Moderate dehydration, corrected  Anion gap metabolic acidosis, corrected  Hypomagnesemia    PENDING LABS     Unresulted Labs Ordered in the Past 30 Days of this Admission       No orders found from 7/23/2023 to 8/23/2023.          PROCEDURES ( this hospitalization only)      None    RECOMMENDATIONS TO OUTPATIENT PROVIDER FOR F/U VISIT     Follow-up Appointments     Follow-up and recommended labs and tests       Follow-up with primary MD in 1 week  Consider CT treatment for alcohol dependence and abuse  CBC, CMP in 1 week            DISPOSITION     Home    SUMMARY OF HOSPITAL COURSE:       Mr.Justin KYLE Mcclelland is a 33 year old male with a past medical history of alcohol abuse, delirium tremens, alcohol-related gastritis, alcoholic hepatitis, recent inpatient treatment at [facility] in 5/2023, came with heavy alcohol use and alcohol withdrawal symptoms with nausea and vomiting.  CIWA protocol utilized. Had minimal alcohol withdrawal symptoms.  Absolute alcohol cessation is advised.   provided more resources for treatment.  Had inpatient treatment and is still done in 5/2023.  Folic acid and thiamine supplement.  Had moderate dehydration and anion gap metabolic acidosis and hypomagnesemia on admission which are corrected.  Has alcoholic  hepatitis.  LFTs are improved.  CT scan revealed diffuse fatty infiltration of liver.  Episode of hypoglycemia on admission due to poor oral intake.  Appetite is much improved prior to discharge.  Blood glucose numbers are stable.     Discharge Medications with Med changes:     Current Discharge Medication List        START taking these medications    Details   folic acid (FOLVITE) 1 MG tablet Take 1 tablet (1 mg) by mouth daily  Qty: 30 tablet, Refills: 0    Associated Diagnoses: Alcohol dependence with uncomplicated withdrawal (H)      LORazepam (ATIVAN) 1 MG tablet Take 1 tablet (1 mg) by mouth every 6 hours as needed for anxiety for 1 day, THEN 1 tablet (1 mg) every 8 hours as needed for anxiety for 2 days, THEN 1 tablet (1 mg) every 12 hours as needed for anxiety.  Qty: 14 tablet, Refills: 0      !! magnesium oxide (MAG-OX) 400 MG tablet Take 1 tablet (400 mg) by mouth daily  Qty: 30 tablet, Refills: 0    Associated Diagnoses: Alcohol dependence with uncomplicated withdrawal (H)      !! magnesium oxide (MAG-OX) 400 MG tablet Take 1 tablet (400 mg) by mouth daily  Qty: 30 tablet, Refills: 0      multivitamin w/minerals (THERA-VIT-M) tablet Take 1 tablet by mouth daily  Qty: 30 tablet, Refills: 0    Associated Diagnoses: Alcohol dependence with uncomplicated withdrawal (H)      ondansetron (ZOFRAN ODT) 4 MG ODT tab Take 1 tablet (4 mg) by mouth every 6 hours as needed for nausea or vomiting  Qty: 12 tablet, Refills: 0      thiamine (B-1) 100 MG tablet Take 1 tablet (100 mg) by mouth daily  Qty: 30 tablet, Refills: 0    Associated Diagnoses: Alcohol dependence with uncomplicated withdrawal (H)       !! - Potential duplicate medications found. Please discuss with provider.        CONTINUE these medications which have NOT CHANGED    Details   !! gabapentin (NEURONTIN) 300 MG capsule Take 300 mg by mouth 2 times daily Morning and afternoon.      !! gabapentin (NEURONTIN) 300 MG capsule Take 600 mg by mouth At Bedtime       naltrexone (DEPADE/REVIA) 50 MG tablet Take 50 mg by mouth daily       !! - Potential duplicate medications found. Please discuss with provider.        STOP taking these medications       Magnesium Bisglycinate (MAG GLYCINATE) 100 MG TABS Comments:   Reason for Stopping:                     Rationale for medication changes:      Folic acid, thiamine, magnesium supplement        Consults   CARE MANAGEMENT / SOCIAL WORK IP CONSULT    Immunizations given this encounter     Most Recent Immunizations   Administered Date(s) Administered     COVID-19 Vaccine ([employer]) 04/05/2021           Anticoagulation Information      None      SIGNIFICANT IMAGING FINDINGS     No results found for this visit on 08/22/23.    SIGNIFICANT LABORATORY FINDINGS   Creatinine 0.77  WBC 4.9, hemoglobin 14, platelet 161  --> 134, -->138, protein 8.4-->5.7, bilirubin 2-->1.4  Magnesium 1.5-->1.8  Discharge Orders        Reason for your hospital stay    Alcohol dependence and withdrawal     Follow-up and recommended labs and tests     Follow-up with primary MD in 1 week  Consider CT treatment for alcohol dependence and abuse  CBC, CMP in 1 week     Activity    Your activity upon discharge: activity as tolerated     Diet    Follow this diet upon discharge: regular diet       Examination   Physical Exam   Temp:  [97.7  F (36.5  C)-98.5  F (36.9  C)] 98.5  F (36.9  C)  Pulse:  [64-82] 72  Resp:  [16-18] 16  BP: (115-134)/(76-94) 122/94  SpO2:  [97 %-100 %] 97 %  Wt Readings from Last 1 Encounters:   08/24/23 67 kg (147 lb 9.6 oz)     GENERAL:  Alert, appears comfortable, in no acute distress, appears stated age   HEAD:  Normocephalic, without obvious abnormality, atraumatic   EYES:  PERRL, conjunctiva clear,   EOM's intact   NOSE: Nares normal,  mucosa normal, no drainage   THROAT: Lips, mucosa,  gums normal, mouth moist   NECK: Supple, symmetrical, trachea midline   BACK:   Symmetric, no curvature, ROM normal   LUNGS:   Clear to  auscultation bilaterally, no rales, rhonchi, or wheezing, symmetric chest rise on inhalation, respirations unlabored   CHEST WALL:  No tenderness or deformity   HEART:  Regular rate and rhythm, S1 and S2 normal, no murmur    ABDOMEN:   Soft, non-tender, bowel sounds active , no masses, no organomegaly, no rebound or guarding   EXTREMITIES: No  edema    SKIN: No rashes   NEURO: Alert, oriented x3, moves all four extremities freely   PSYCH: Cooperative, behavior is appropriate        Please see EMR for more detailed significant labs, imaging, consultant notes etc.    IMerline MD, personally saw the patient today and spent greater than 30 minutes discharging this patient.    Merline Tran MD  [facility]    CC:Ana Sanchez

## 2023-08-24 NOTE — PROGRESS NOTES
Care Management Follow Up    Length of Stay (days): 0    Expected Discharge Date: 08/24/2023     Concerns to be Addressed: no discharge needs identified, all concerns addressed in this encounter, denies needs/concerns at this time     Patient plan of care discussed at interdisciplinary rounds: No    Anticipated Discharge Disposition: Home, Outpatient Chemical Dependency, Inpatient Chemical Dependency     Anticipated Discharge Services:  (No new services anticipated.)  Anticipated Discharge DME: None    Patient/family educated on Medicare website which has current facility and service quality ratings: no (Not applicable)  Education Provided on the Discharge Plan:  yes  Patient/Family in Agreement with the Plan: yes    Referrals Placed by CM/SW:  (Not currently indicated.)  Private pay costs discussed: Not applicable    Additional Information:    10:20 AM  DAHIANA discussed udpates with MD.  Potential discharge pending lab results.    DAHIANA met with Pt to discuss discharge planning.  Pt's wife present at bedside during visit.  Pt reports attending intensive outpatient treatment at [facility] 4 days per week for 3 hours per day.  Pt requests that DAHIANA calls [facility] to notify them of his hospitalization.  Pt reports plan to speak with his counselor to adjust treatment plan as needed.  Pt requesting mental health therapist resources in the community.  DAHIANA providing information in Pt's discharge paperwork.      DAHIANA called main line at [facility] (ph: [phone]) and was transferred to clinician at [place] location.  DAHIANA left voice mail providing update regarding Pt's current hospitalization.     MIRIAM Lujan

## 2023-08-24 NOTE — PROGRESS NOTES
"Pts BG 78 this AM despite IV D5NS, pt ate breakfast and post-prandial BG was 92. Barrier to discharge: blood glucose control w/o D5NS.      PRIMARY DIAGNOSIS: \"GENERIC\" NURSING  OUTPATIENT/OBSERVATION GOALS TO BE MET BEFORE DISCHARGE:  ADLs back to baseline: Yes    Activity and level of assistance: Ambulating independently.    Pain status: Pain free.    Return to near baseline physical activity: Yes     Discharge Planner Nurse   Safe discharge environment identified: Yes  Barriers to discharge: Yes       Entered by: Meseret Reddy RN 08/24/2023 10:25 AM       "

## 2023-08-24 NOTE — PLAN OF CARE
"VSS on RA. CIWA scoring 9 for significant paroxysmal sweating/HA/tremors, 1mg PO ativan given, scoring 0 on recheck. K/Mg+ replaced per protocol. Tolerating indep ambulation in room. Education done on medication mgmt, withdrawal and care plan with wife at bedside.    PRIMARY DIAGNOSIS: \"GENERIC\" NURSING  OUTPATIENT/OBSERVATION GOALS TO BE MET BEFORE DISCHARGE:  ADLs back to baseline: Yes    Activity and level of assistance: Ambulating independently.    Pain status: Pain free.    Return to near baseline physical activity: Yes     Discharge Planner Nurse   Safe discharge environment identified: Yes  Barriers to discharge: Yes       Entered by: Meseret Reddy RN 08/24/2023 6:36 AM       "

## 2023-08-24 NOTE — PROGRESS NOTES
"PRIMARY DIAGNOSIS: \"GENERIC\" NURSING  OUTPATIENT/OBSERVATION GOALS TO BE MET BEFORE DISCHARGE:  ADLs back to baseline: Yes    Activity and level of assistance: Ambulating independently.    Pain status: Pain free.    Return to near baseline physical activity: Yes     Discharge Planner Nurse   Safe discharge environment identified: Yes  Barriers to discharge: Yes       Entered by: Meseret Reddy RN 08/23/2023 8:13 PM   A&O x4. VSS on RA. Reports HA 4/10, IV Toradol given. BG currently 75, remains on D5NS @ 100mL/hr. CIWA scoring 5.  "

## 2023-08-24 NOTE — PROGRESS NOTES
"PRIMARY DIAGNOSIS: \"GENERIC\" NURSING  OUTPATIENT/OBSERVATION GOALS TO BE MET BEFORE DISCHARGE:  ADLs back to baseline: Yes    Activity and level of assistance: Ambulating independently.    Pain status: Pain free.    Return to near baseline physical activity: Yes     Discharge Planner Nurse   Safe discharge environment identified: Yes  Barriers to discharge: No. Glucose stabilized.        Entered by: Ana Segura RN 08/24/2023 1:30 PM     Please review provider order for any additional goals.   Nurse to notify provider when observation goals have been met and patient is ready for discharge.    Pt alert and oriented x 4. VSS. Lung sounds clear. Denies SOB and chest pain. Has intermittent nausea. Refused med intervention. Tolerating regular diet. Plan to discharge home with wife and follow up with outpatient services. Discharge education completed.   "

## 2023-08-31 ENCOUNTER — LAB REQUISITION (OUTPATIENT)
Dept: LAB | Facility: CLINIC | Age: 33
End: 2023-08-31
Payer: COMMERCIAL

## 2023-08-31 DIAGNOSIS — K70.9 ALCOHOLIC LIVER DISEASE, UNSPECIFIED (H): ICD-10-CM

## 2023-08-31 LAB
ALBUMIN SERPL BCG-MCNC: 4.3 G/DL (ref 3.5–5.2)
ALP SERPL-CCNC: 85 U/L (ref 40–129)
ALT SERPL W P-5'-P-CCNC: 510 U/L (ref 0–70)
ANION GAP SERPL CALCULATED.3IONS-SCNC: 12 MMOL/L (ref 7–15)
AST SERPL W P-5'-P-CCNC: 257 U/L (ref 0–45)
BILIRUB SERPL-MCNC: 0.7 MG/DL
BUN SERPL-MCNC: 9 MG/DL (ref 6–20)
CALCIUM SERPL-MCNC: 9.3 MG/DL (ref 8.6–10)
CHLORIDE SERPL-SCNC: 105 MMOL/L (ref 98–107)
CREAT SERPL-MCNC: 0.84 MG/DL (ref 0.67–1.17)
DEPRECATED HCO3 PLAS-SCNC: 24 MMOL/L (ref 22–29)
GFR SERPL CREATININE-BSD FRML MDRD: >90 ML/MIN/1.73M2
GLUCOSE SERPL-MCNC: 85 MG/DL (ref 70–99)
POTASSIUM SERPL-SCNC: 3.9 MMOL/L (ref 3.4–5.3)
PROT SERPL-MCNC: 6.5 G/DL (ref 6.4–8.3)
SODIUM SERPL-SCNC: 141 MMOL/L (ref 136–145)

## 2023-08-31 PROCEDURE — 80053 COMPREHEN METABOLIC PANEL: CPT | Mod: ORL | Performed by: PHYSICIAN ASSISTANT

## 2023-10-04 ENCOUNTER — HOSPITAL ENCOUNTER (EMERGENCY)
Facility: CLINIC | Age: 33
Discharge: HOME | End: 2023-10-04
Attending: EMERGENCY MEDICINE | Admitting: EMERGENCY MEDICINE
Payer: COMMERCIAL

## 2023-10-04 VITALS
OXYGEN SATURATION: 99 % | TEMPERATURE: 98.5 F | WEIGHT: 150 LBS | BODY MASS INDEX: 22.73 KG/M2 | HEART RATE: 66 BPM | RESPIRATION RATE: 16 BRPM | SYSTOLIC BLOOD PRESSURE: 133 MMHG | DIASTOLIC BLOOD PRESSURE: 96 MMHG | HEIGHT: 68 IN

## 2023-10-04 DIAGNOSIS — S01.112A EYEBROW LACERATION, LEFT, INITIAL ENCOUNTER: ICD-10-CM

## 2023-10-04 PROCEDURE — 99283 EMERGENCY DEPT VISIT LOW MDM: CPT | Mod: 25 | Performed by: EMERGENCY MEDICINE

## 2023-10-04 PROCEDURE — 90715 TDAP VACCINE 7 YRS/> IM: CPT | Performed by: EMERGENCY MEDICINE

## 2023-10-04 PROCEDURE — 12011 RPR F/E/E/N/L/M 2.5 CM/<: CPT

## 2023-10-04 PROCEDURE — 250N000011 HC RX IP 250 OP 636: Performed by: EMERGENCY MEDICINE

## 2023-10-04 PROCEDURE — 90471 IMMUNIZATION ADMIN: CPT | Performed by: EMERGENCY MEDICINE

## 2023-10-04 PROCEDURE — 12011 RPR F/E/E/N/L/M 2.5 CM/<: CPT | Performed by: EMERGENCY MEDICINE

## 2023-10-04 PROCEDURE — 99283 EMERGENCY DEPT VISIT LOW MDM: CPT | Mod: 25

## 2023-10-04 RX ADMIN — CLOSTRIDIUM TETANI TOXOID ANTIGEN (FORMALDEHYDE INACTIVATED), CORYNEBACTERIUM DIPHTHERIAE TOXOID ANTIGEN (FORMALDEHYDE INACTIVATED), BORDETELLA PERTUSSIS TOXOID ANTIGEN (GLUTARALDEHYDE INACTIVATED), BORDETELLA PERTUSSIS FILAMENTOUS HEMAGGLUTININ ANTIGEN (FORMALDEHYDE INACTIVATED), BORDETELLA PERTUSSIS PERTACTIN ANTIGEN, AND BORDETELLA PERTUSSIS FIMBRIAE 2/3 ANTIGEN 0.5 ML: 5; 2; 2.5; 5; 3; 5 INJECTION, SUSPENSION INTRAMUSCULAR at 04:22

## 2023-10-04 ASSESSMENT — ENCOUNTER SYMPTOMS
BACK PAIN: 0
NAUSEA: 0
HEADACHES: 0
NECK PAIN: 0
WOUND: 1
LIGHT-HEADEDNESS: 0

## 2023-10-04 ASSESSMENT — ACTIVITIES OF DAILY LIVING (ADL): ADLS_ACUITY_SCORE: 35

## 2023-10-04 NOTE — ED PROVIDER NOTES
History     Chief Complaint   Patient presents with    Laceration     Left eyebrow     HPI  Aakash Mcclelland is a 33 year old male with a history of alcohol use disorder presenting for evaluation of a laceration to his head.  Patient reports he was dreaming about playing football and diving for a ball and woke up as he dove out of bed striking his head on a adjacent desk.  Patient was slightly slipped on when he woke up and walks to the nurses station at [place] where he is being treated for his alcohol use disorder  They cleaned up his wound and sent him in for evaluation.  Patient complaining of mild discomfort over his left eyebrow where he suffered a laceration.  Denies headache.  Denies vision changes.  Denies numbness, tingling, or weakness.        Allergies:  Allergies   Allergen Reactions    Cephalosporins Rash       Problem List:    Patient Active Problem List    Diagnosis Date Noted    Nausea 08/22/2023     Priority: Medium    Alcohol dependence with uncomplicated withdrawal (H) 08/22/2023     Priority: Medium    Alcoholic ketosis (H) 05/17/2023     Priority: Medium    Alcohol withdrawal syndrome with complication (H) 05/17/2023     Priority: Medium    Alcoholic hepatitis without ascites (H28) 03/21/2023     Priority: Medium    Alcohol withdrawal seizure without complication (H) 03/20/2023     Priority: Medium        Past Medical History:    No past medical history on file.    Past Surgical History:    Past Surgical History:   Procedure Laterality Date    IR MISCELLANEOUS PROCEDURE  7/27/2009    IR MISCELLANEOUS PROCEDURE  2/26/2012       Family History:    No family history on file.    Social History:  Marital Status:   [2]  Social History     Tobacco Use    Smoking status: Never    Smokeless tobacco: Never        Medications:    folic acid (FOLVITE) 1 MG tablet  gabapentin (NEURONTIN) 300 MG capsule  gabapentin (NEURONTIN) 300 MG capsule  magnesium oxide (MAG-OX) 400 MG tablet  magnesium oxide  "(MAG-OX) 400 MG tablet  multivitamin w/minerals (THERA-VIT-M) tablet  naltrexone (DEPADE/REVIA) 50 MG tablet  ondansetron (ZOFRAN ODT) 4 MG ODT tab  thiamine (B-1) 100 MG tablet          Review of Systems   Eyes:  Negative for visual disturbance.   Gastrointestinal:  Negative for nausea.   Musculoskeletal:  Negative for back pain and neck pain.   Skin:  Positive for wound.   Neurological:  Negative for light-headedness and headaches.   All other systems reviewed and are negative.      Physical Exam   BP: (!) 133/96  Pulse: 59  Temp: 98.5  F (36.9  C)  Resp: 16  Height: 172.7 cm (5' 8\")  Weight: 68 kg (150 lb)  SpO2: 99 %      Physical Exam  Vitals and nursing note reviewed.   Constitutional:       Appearance: Normal appearance. He is not ill-appearing or diaphoretic.   HENT:      Head: Atraumatic.        Mouth/Throat:      Mouth: Mucous membranes are moist.   Eyes:      Conjunctiva/sclera: Conjunctivae normal.      Pupils: Pupils are equal, round, and reactive to light.   Cardiovascular:      Rate and Rhythm: Normal rate.      Pulses: Normal pulses.   Skin:     General: Skin is warm and dry.      Capillary Refill: Capillary refill takes less than 2 seconds.   Neurological:      Mental Status: He is alert and oriented to person, place, and time.   Psychiatric:         Mood and Affect: Mood normal.         ED [facility]    -Laceration Repair    Date/Time: 10/4/2023 5:43 AM    Performed by: William Garcia MD  Authorized by: William Garcia MD    Risks, benefits and alternatives discussed.      ANESTHESIA (see MAR for exact dosages):     Anesthesia method:  Local infiltration    Local anesthetic:  Bupivacaine 0.25% w/o epi  LACERATION DETAILS     Location:  Face    Face location:  L eyebrow    Length (cm):  1    Depth (mm):  2    REPAIR TYPE:     Repair type:  Simple      TREATMENT:     Irrigation solution:  Sterile water    Irrigation method:  " Syringe    SKIN REPAIR     Repair method:  Sutures    Suture size:  6-0    Suture material:  Nylon    Suture technique:  Simple interrupted    Number of sutures:  3    APPROXIMATION     Approximation:  Close    POST-PROCEDURE DETAILS     Dressing:  Antibiotic ointment and adhesive bandage      PROCEDURE    Patient Tolerance:  Patient tolerated the procedure well with no immediate complications                  No results found for this or any previous visit (from the past 24 hour(s)).    Medications   Tdap (tetanus-diphtheria-acell pertussis) (ADACEL) injection 0.5 mL (0.5 mLs Intramuscular $Given 10/4/23 7971)       Assessments & Plan (with Medical Decision Making)  33-year-old presenting for evaluation of a laceration to his forehead.  Excellently struck his head on a table when he fell out of bed due to having a dream.  Had a small laceration to his left eyebrow which was cleaned and repaired.  Tetanus updated.  Will need suture removal in about 1 week.     I have reviewed the nursing notes.    I have reviewed the findings, diagnosis, plan and need for follow up with the patient.             Discharge Medication List as of 10/4/2023  5:12 AM          Final diagnoses:   Eyebrow laceration, left, initial encounter       10/4/2023   [facility] EMERGENCY DEPT       Garcia, William Price MD  10/04/23 0545       Garcia, William Price MD  10/04/23 0519

## 2023-10-04 NOTE — ED TRIAGE NOTES
Currently 2 weeks at [facility]. Tonight was sleeping and dreamed about catching a football and dove out of bed. Landed on the edge of his desk in his room. Now a small a laceration across the left eyebrow. Last Tetanus is unknown by Aakash and no tetanus listed on the [facility]

## 2023-10-04 NOTE — ED NOTES
1-1.5 cm laceration in the left eyebrow. Cleaned with soap and water and irrigated with 30 ml normal saline.

## 2023-10-04 NOTE — DISCHARGE INSTRUCTIONS
Follow-up in about 1 week for suture removal.  Monitor for any signs of infection and return if you have any concerns for infection or other problems.  You may experience some symptoms of concussion.

## 2023-10-04 NOTE — ED NOTES
Called Albert SOSA at [facility] and updated on discharge. Sending the van.

## 2023-10-20 ENCOUNTER — LAB REQUISITION (OUTPATIENT)
Dept: LAB | Facility: CLINIC | Age: 33
End: 2023-10-20
Payer: COMMERCIAL

## 2023-10-20 DIAGNOSIS — K70.9 ALCOHOLIC LIVER DISEASE, UNSPECIFIED (H): ICD-10-CM

## 2023-10-20 LAB
ALBUMIN SERPL BCG-MCNC: 4.3 G/DL (ref 3.5–5.2)
ALP SERPL-CCNC: 67 U/L (ref 40–129)
ALT SERPL W P-5'-P-CCNC: 32 U/L (ref 0–70)
ANION GAP SERPL CALCULATED.3IONS-SCNC: 13 MMOL/L (ref 7–15)
AST SERPL W P-5'-P-CCNC: 30 U/L (ref 0–45)
BILIRUB SERPL-MCNC: 0.6 MG/DL
BUN SERPL-MCNC: 8 MG/DL (ref 6–20)
CALCIUM SERPL-MCNC: 9 MG/DL (ref 8.6–10)
CHLORIDE SERPL-SCNC: 103 MMOL/L (ref 98–107)
CREAT SERPL-MCNC: 0.79 MG/DL (ref 0.67–1.17)
DEPRECATED HCO3 PLAS-SCNC: 23 MMOL/L (ref 22–29)
EGFRCR SERPLBLD CKD-EPI 2021: >90 ML/MIN/1.73M2
GLUCOSE SERPL-MCNC: 89 MG/DL (ref 70–99)
POTASSIUM SERPL-SCNC: 3.1 MMOL/L (ref 3.4–5.3)
PROT SERPL-MCNC: 6.5 G/DL (ref 6.4–8.3)
SODIUM SERPL-SCNC: 139 MMOL/L (ref 135–145)

## 2023-10-20 PROCEDURE — 80053 COMPREHEN METABOLIC PANEL: CPT | Mod: ORL | Performed by: PHYSICIAN ASSISTANT

## 2023-12-23 ENCOUNTER — HEALTH MAINTENANCE LETTER (OUTPATIENT)
Age: 33
End: 2023-12-23

## 2024-01-29 ENCOUNTER — LAB REQUISITION (OUTPATIENT)
Dept: LAB | Facility: CLINIC | Age: 34
End: 2024-01-29
Payer: COMMERCIAL

## 2024-01-29 DIAGNOSIS — F10.11 ALCOHOL ABUSE, IN REMISSION: ICD-10-CM

## 2024-01-29 LAB
ALBUMIN SERPL BCG-MCNC: 5 G/DL (ref 3.5–5.2)
ALP SERPL-CCNC: 95 U/L (ref 40–150)
ALT SERPL W P-5'-P-CCNC: 46 U/L (ref 0–70)
ANION GAP SERPL CALCULATED.3IONS-SCNC: 19 MMOL/L (ref 7–15)
AST SERPL W P-5'-P-CCNC: 45 U/L (ref 0–45)
BILIRUB SERPL-MCNC: 0.9 MG/DL
BUN SERPL-MCNC: 6 MG/DL (ref 6–20)
CALCIUM SERPL-MCNC: 9.7 MG/DL (ref 8.6–10)
CHLORIDE SERPL-SCNC: 96 MMOL/L (ref 98–107)
CREAT SERPL-MCNC: 0.71 MG/DL (ref 0.67–1.17)
DEPRECATED HCO3 PLAS-SCNC: 27 MMOL/L (ref 22–29)
EGFRCR SERPLBLD CKD-EPI 2021: >90 ML/MIN/1.73M2
GLUCOSE SERPL-MCNC: 115 MG/DL (ref 70–99)
POTASSIUM SERPL-SCNC: 3.5 MMOL/L (ref 3.4–5.3)
PROT SERPL-MCNC: 7.5 G/DL (ref 6.4–8.3)
SODIUM SERPL-SCNC: 142 MMOL/L (ref 135–145)

## 2024-01-29 PROCEDURE — 80053 COMPREHEN METABOLIC PANEL: CPT | Mod: ORL | Performed by: PHYSICIAN ASSISTANT

## 2024-03-11 ENCOUNTER — LAB REQUISITION (OUTPATIENT)
Dept: LAB | Facility: CLINIC | Age: 34
End: 2024-03-11
Payer: COMMERCIAL

## 2024-03-11 DIAGNOSIS — F10.11 ALCOHOL ABUSE, IN REMISSION: ICD-10-CM

## 2024-03-11 PROCEDURE — 80053 COMPREHEN METABOLIC PANEL: CPT | Mod: ORL | Performed by: PHYSICIAN ASSISTANT

## 2024-03-12 LAB
ALBUMIN SERPL BCG-MCNC: 4.6 G/DL (ref 3.5–5.2)
ALP SERPL-CCNC: 98 U/L (ref 40–150)
ALT SERPL W P-5'-P-CCNC: 44 U/L (ref 0–70)
ANION GAP SERPL CALCULATED.3IONS-SCNC: 11 MMOL/L (ref 7–15)
AST SERPL W P-5'-P-CCNC: 28 U/L (ref 0–45)
BILIRUB SERPL-MCNC: 0.2 MG/DL
BUN SERPL-MCNC: 7.4 MG/DL (ref 6–20)
CALCIUM SERPL-MCNC: 9.1 MG/DL (ref 8.6–10)
CHLORIDE SERPL-SCNC: 101 MMOL/L (ref 98–107)
CREAT SERPL-MCNC: 0.72 MG/DL (ref 0.67–1.17)
DEPRECATED HCO3 PLAS-SCNC: 28 MMOL/L (ref 22–29)
EGFRCR SERPLBLD CKD-EPI 2021: >90 ML/MIN/1.73M2
GLUCOSE SERPL-MCNC: 79 MG/DL (ref 70–99)
POTASSIUM SERPL-SCNC: 4.1 MMOL/L (ref 3.4–5.3)
PROT SERPL-MCNC: 7 G/DL (ref 6.4–8.3)
SODIUM SERPL-SCNC: 140 MMOL/L (ref 135–145)

## 2025-01-18 ENCOUNTER — HEALTH MAINTENANCE LETTER (OUTPATIENT)
Age: 35
End: 2025-01-18